# Patient Record
Sex: FEMALE | Race: WHITE | NOT HISPANIC OR LATINO | Employment: FULL TIME | URBAN - METROPOLITAN AREA
[De-identification: names, ages, dates, MRNs, and addresses within clinical notes are randomized per-mention and may not be internally consistent; named-entity substitution may affect disease eponyms.]

---

## 2022-08-31 ENCOUNTER — TRANSFERRED RECORDS (OUTPATIENT)
Dept: HEALTH INFORMATION MANAGEMENT | Facility: CLINIC | Age: 57
End: 2022-08-31

## 2022-10-19 ENCOUNTER — LAB REQUISITION (OUTPATIENT)
Dept: LAB | Facility: CLINIC | Age: 57
End: 2022-10-19

## 2022-10-19 ENCOUNTER — TRANSFERRED RECORDS (OUTPATIENT)
Dept: HEALTH INFORMATION MANAGEMENT | Facility: CLINIC | Age: 57
End: 2022-10-19

## 2022-10-19 LAB
ANION GAP SERPL CALCULATED.3IONS-SCNC: 6 MMOL/L (ref 7–15)
BUN SERPL-MCNC: 17.2 MG/DL (ref 6–20)
CALCIUM SERPL-MCNC: 9.8 MG/DL (ref 8.6–10)
CHLORIDE SERPL-SCNC: 98 MMOL/L (ref 98–107)
CREAT SERPL-MCNC: 0.75 MG/DL (ref 0.51–0.95)
DEPRECATED HCO3 PLAS-SCNC: 30 MMOL/L (ref 22–29)
GFR SERPL CREATININE-BSD FRML MDRD: >90 ML/MIN/1.73M2
GLUCOSE SERPL-MCNC: 81 MG/DL (ref 70–99)
POTASSIUM SERPL-SCNC: 5.1 MMOL/L (ref 3.4–5.3)
SODIUM SERPL-SCNC: 134 MMOL/L (ref 136–145)
TSH SERPL DL<=0.005 MIU/L-ACNC: 0.49 UIU/ML (ref 0.3–4.2)
VIT B12 SERPL-MCNC: 942 PG/ML (ref 232–1245)

## 2022-10-19 PROCEDURE — 80048 BASIC METABOLIC PNL TOTAL CA: CPT | Performed by: FAMILY MEDICINE

## 2022-10-19 PROCEDURE — 82607 VITAMIN B-12: CPT | Performed by: FAMILY MEDICINE

## 2022-10-19 PROCEDURE — 84443 ASSAY THYROID STIM HORMONE: CPT | Performed by: FAMILY MEDICINE

## 2023-04-05 ENCOUNTER — TRANSFERRED RECORDS (OUTPATIENT)
Dept: HEALTH INFORMATION MANAGEMENT | Facility: CLINIC | Age: 58
End: 2023-04-05
Payer: COMMERCIAL

## 2023-04-06 ENCOUNTER — OFFICE VISIT (OUTPATIENT)
Dept: FAMILY MEDICINE | Facility: CLINIC | Age: 58
End: 2023-04-06
Payer: COMMERCIAL

## 2023-04-06 VITALS
HEIGHT: 64 IN | BODY MASS INDEX: 24.07 KG/M2 | TEMPERATURE: 98.3 F | DIASTOLIC BLOOD PRESSURE: 77 MMHG | RESPIRATION RATE: 17 BRPM | WEIGHT: 141 LBS | SYSTOLIC BLOOD PRESSURE: 116 MMHG | OXYGEN SATURATION: 98 %

## 2023-04-06 DIAGNOSIS — K64.9 HEMORRHOIDS, UNSPECIFIED HEMORRHOID TYPE: ICD-10-CM

## 2023-04-06 DIAGNOSIS — F41.9 ANXIETY: ICD-10-CM

## 2023-04-06 DIAGNOSIS — G47.00 INSOMNIA, UNSPECIFIED TYPE: ICD-10-CM

## 2023-04-06 DIAGNOSIS — F33.1 MODERATE EPISODE OF RECURRENT MAJOR DEPRESSIVE DISORDER (H): ICD-10-CM

## 2023-04-06 DIAGNOSIS — R10.31 RLQ ABDOMINAL PAIN: Primary | ICD-10-CM

## 2023-04-06 LAB
BASOPHILS # BLD AUTO: 0 10E3/UL (ref 0–0.2)
BASOPHILS NFR BLD AUTO: 0 %
EOSINOPHIL # BLD AUTO: 0.1 10E3/UL (ref 0–0.7)
EOSINOPHIL NFR BLD AUTO: 2 %
ERYTHROCYTE [DISTWIDTH] IN BLOOD BY AUTOMATED COUNT: 13.6 % (ref 10–15)
HCT VFR BLD AUTO: 41.8 % (ref 35–47)
HGB BLD-MCNC: 13.9 G/DL (ref 11.7–15.7)
LYMPHOCYTES # BLD AUTO: 1.9 10E3/UL (ref 0.8–5.3)
LYMPHOCYTES NFR BLD AUTO: 29 %
MCH RBC QN AUTO: 30.2 PG (ref 26.5–33)
MCHC RBC AUTO-ENTMCNC: 33.3 G/DL (ref 31.5–36.5)
MCV RBC AUTO: 91 FL (ref 78–100)
MONOCYTES # BLD AUTO: 0.7 10E3/UL (ref 0–1.3)
MONOCYTES NFR BLD AUTO: 10 %
NEUTROPHILS # BLD AUTO: 3.8 10E3/UL (ref 1.6–8.3)
NEUTROPHILS NFR BLD AUTO: 59 %
PLATELET # BLD AUTO: 308 10E3/UL (ref 150–450)
RBC # BLD AUTO: 4.6 10E6/UL (ref 3.8–5.2)
WBC # BLD AUTO: 6.4 10E3/UL (ref 4–11)

## 2023-04-06 PROCEDURE — 99204 OFFICE O/P NEW MOD 45 MIN: CPT | Performed by: FAMILY MEDICINE

## 2023-04-06 PROCEDURE — 36415 COLL VENOUS BLD VENIPUNCTURE: CPT | Performed by: FAMILY MEDICINE

## 2023-04-06 PROCEDURE — 85025 COMPLETE CBC W/AUTO DIFF WBC: CPT | Performed by: FAMILY MEDICINE

## 2023-04-06 PROCEDURE — 80053 COMPREHEN METABOLIC PANEL: CPT | Performed by: FAMILY MEDICINE

## 2023-04-06 RX ORDER — ALPRAZOLAM 2 MG
2 TABLET ORAL
COMMUNITY

## 2023-04-06 RX ORDER — FLUOXETINE 10 MG/1
CAPSULE ORAL
Qty: 106 CAPSULE | Refills: 0 | Status: SHIPPED | OUTPATIENT
Start: 2023-04-06 | End: 2023-06-05

## 2023-04-06 RX ORDER — HYDROXYZINE HYDROCHLORIDE 25 MG/1
25-50 TABLET, FILM COATED ORAL
Qty: 60 TABLET | Refills: 0 | Status: SHIPPED | OUTPATIENT
Start: 2023-04-06 | End: 2024-05-24

## 2023-04-06 NOTE — PROGRESS NOTES
Assessment & Plan     RLQ abdominal pain  Will order basic labs and place order for CT of abdomen and pelvis.  Additional recommendations pending results of imaging.  - CT Abdomen Pelvis w Contrast; Future  - CBC with platelets and differential; Future  - Comprehensive metabolic panel (BMP + Alb, Alk Phos, ALT, AST, Total. Bili, TP); Future  - CBC with platelets and differential  - Comprehensive metabolic panel (BMP + Alb, Alk Phos, ALT, AST, Total. Bili, TP)    Moderate episode of recurrent major depressive disorder (H)  Discontinue Celexa.  Start fluoxetine, 1 capsule daily for 2 weeks then increase to 2 capsules daily.  Follow-up in 2 months or sooner as needed.  - FLUoxetine (PROZAC) 10 MG capsule; Take 1 capsule (10 mg) by mouth daily for 14 days, THEN 2 capsules (20 mg) daily for 46 days.    Anxiety  As above.  - FLUoxetine (PROZAC) 10 MG capsule; Take 1 capsule (10 mg) by mouth daily for 14 days, THEN 2 capsules (20 mg) daily for 46 days.    Insomnia, unspecified type  Discussed that alprazolam is a controlled substance and is not intended for regular use for sleep.  Recommend a trial of hydroxyzine for sleep instead of alprazolam.  Follow-up if this is not helping.  - hydrOXYzine (ATARAX) 25 MG tablet; Take 1-2 tablets (25-50 mg) by mouth nightly as needed for other (sleep)    Hemorrhoids, unspecified hemorrhoid type  Referral to colorectal surgery.  - Adult Colorectal Surgery  Referral; Future    42 minutes spent by me on the date of the encounter doing chart review, history and exam, documentation and further activities per the note       See Patient Instructions    Donna Livingston MD  [facility]    Subjective   Anneliece is a 57 year old, presenting for the following health issues:  Pain        4/6/2023     2:38 PM   Additional Questions   Roomed by Linh Banks CMA     [facility]     Pain History:  When did you first notice your pain? 3 months ago   Have you seen  anyone else for your pain? No  Where in your body do you have pain? Abdominal/Flank Pain    Description:   Character: Sharp and shouting   Location:  right pelvic region  Radiation: Back and right leg  Progression of Symptoms:  worsening now but was getting better  Accompanying Signs & Symptoms:  Fever/Chills: YES, chills  Gas/Bloating: YES  Nausea: YES  Vomitting: No  Diarrhea: No  Constipation: YES, a little bit   Dysuria or Hematuria: No, sometimes feels like there is burning in vagina   History:   Trauma: No  Previous similar pain: No  Previous tests done: none  Precipitating factors:   Does the pain change with:     Food: No    Bowel Movement: YES    Urination: No   Other factors:  No  Therapies tried and outcome:  Patient stated she though she was having UTI and she used AZO to treat it but it's not getting better.  Ciprofloxacin 500 mg every 8 hours x5 days.  Urinalysis in chart has 1+ LE, otherwise normal.    Patient was seen by [facility] previously, was seen yesterday.  On exam, it was noted that she had abdominal tenderness and fullness of the mid right abdomen, no RLQ tenderness.  She does have hemorrhoids. Pain comes and goes, not constant.  Got better with the antibiotics, but the doctor told her to stop.  She bought it and took it on her own, had a prescription from [facility].  Eating and drinking okay, some nausea, not vomiting, no diarrhea, some constipation.  Two hours had a bowel movement which was normal.  No pain with urination but some vaginal burning, no vaginal discharge.  History of Total hysterectomy.  He had gallbladder surgery, but does not remember if she has her appendix.  She has been taking Naproxen, which helps a little bit. She is uncertain if she still has her ovaries. No vaginal bleeding.  She is losing weght, poor appetite, eating because she has to, not because she is hungry.    Citalopram for anxiety and lack of sleep. but can't tolerated it now, taking  "alprazolam 2 mg from [facility] but has 7 pills left and she can't sleep without it.  She has not taken other medications for this, wondering what she can do to sleep.  Has tried Melatonin, but did not sleep well.  She took Sertraline, did work for her.    No LMP recorded. Patient is postmenopausal.      Review of Systems   Constitutional, HEENT, cardiovascular, pulmonary, gi and gu systems are negative, except as otherwise noted.      Objective    /77 (BP Location: Right arm, Patient Position: Sitting, Cuff Size: Adult Regular)   Temp 98.3  F (36.8  C) (Oral)   Resp 17   Ht 1.62 m (5' 3.78\")   Wt 64 kg (141 lb)   SpO2 98%   BMI 24.37 kg/m    Body mass index is 24.37 kg/m .  Physical Exam   GENERAL: healthy, alert and no distress  RESP: lungs clear to auscultation - no rales, rhonchi or wheezes  CV: regular rate and rhythm, normal S1 S2, no S3 or S4, no murmur, click or rub, no peripheral edema and peripheral pulses strong  ABDOMEN: bowel sounds normal and soft, tender to palpation in right lower quadrant  : External hemorrhoids present, nonthrombosed                "

## 2023-04-07 LAB
ALBUMIN SERPL BCG-MCNC: 4.6 G/DL (ref 3.5–5.2)
ALP SERPL-CCNC: 66 U/L (ref 35–104)
ALT SERPL W P-5'-P-CCNC: 16 U/L (ref 10–35)
ANION GAP SERPL CALCULATED.3IONS-SCNC: 11 MMOL/L (ref 7–15)
AST SERPL W P-5'-P-CCNC: 24 U/L (ref 10–35)
BILIRUB SERPL-MCNC: 0.2 MG/DL
BUN SERPL-MCNC: 16.2 MG/DL (ref 6–20)
CALCIUM SERPL-MCNC: 10.1 MG/DL (ref 8.6–10)
CHLORIDE SERPL-SCNC: 103 MMOL/L (ref 98–107)
CREAT SERPL-MCNC: 0.91 MG/DL (ref 0.51–0.95)
DEPRECATED HCO3 PLAS-SCNC: 25 MMOL/L (ref 22–29)
GFR SERPL CREATININE-BSD FRML MDRD: 73 ML/MIN/1.73M2
GLUCOSE SERPL-MCNC: 88 MG/DL (ref 70–99)
POTASSIUM SERPL-SCNC: 4.2 MMOL/L (ref 3.4–5.3)
PROT SERPL-MCNC: 7.4 G/DL (ref 6.4–8.3)
SODIUM SERPL-SCNC: 139 MMOL/L (ref 136–145)

## 2023-05-21 ENCOUNTER — HEALTH MAINTENANCE LETTER (OUTPATIENT)
Age: 58
End: 2023-05-21

## 2023-08-29 ENCOUNTER — HOSPITAL ENCOUNTER (EMERGENCY)
Facility: CLINIC | Age: 58
Discharge: HOME | End: 2023-08-29
Attending: EMERGENCY MEDICINE | Admitting: EMERGENCY MEDICINE

## 2023-08-29 ENCOUNTER — APPOINTMENT (OUTPATIENT)
Dept: CT IMAGING | Facility: CLINIC | Age: 58
End: 2023-08-29

## 2023-08-29 VITALS
SYSTOLIC BLOOD PRESSURE: 129 MMHG | HEART RATE: 59 BPM | DIASTOLIC BLOOD PRESSURE: 80 MMHG | RESPIRATION RATE: 16 BRPM | OXYGEN SATURATION: 98 % | TEMPERATURE: 98.2 F

## 2023-08-29 DIAGNOSIS — K62.89 RECTAL PAIN: ICD-10-CM

## 2023-08-29 DIAGNOSIS — R10.31 ABDOMINAL PAIN, RIGHT LOWER QUADRANT: ICD-10-CM

## 2023-08-29 DIAGNOSIS — K76.9 LIVER LESION: ICD-10-CM

## 2023-08-29 DIAGNOSIS — K64.4 EXTERNAL HEMORRHOIDS: ICD-10-CM

## 2023-08-29 LAB
ALBUMIN SERPL BCG-MCNC: 4.4 G/DL (ref 3.5–5.2)
ALBUMIN UR-MCNC: NEGATIVE MG/DL
ALP SERPL-CCNC: 60 U/L (ref 35–104)
ALT SERPL W P-5'-P-CCNC: 14 U/L (ref 0–50)
ANION GAP SERPL CALCULATED.3IONS-SCNC: 7 MMOL/L (ref 7–15)
APPEARANCE UR: CLEAR
AST SERPL W P-5'-P-CCNC: 17 U/L (ref 0–45)
BASOPHILS # BLD AUTO: 0 10E3/UL (ref 0–0.2)
BASOPHILS NFR BLD AUTO: 0 %
BILIRUB SERPL-MCNC: 0.3 MG/DL
BILIRUB UR QL STRIP: NEGATIVE
BUN SERPL-MCNC: 12 MG/DL (ref 6–20)
CALCIUM SERPL-MCNC: 10 MG/DL (ref 8.6–10)
CHLORIDE SERPL-SCNC: 105 MMOL/L (ref 98–107)
COLOR UR AUTO: YELLOW
CREAT SERPL-MCNC: 0.75 MG/DL (ref 0.51–0.95)
DEPRECATED HCO3 PLAS-SCNC: 29 MMOL/L (ref 22–29)
EOSINOPHIL # BLD AUTO: 0.1 10E3/UL (ref 0–0.7)
EOSINOPHIL NFR BLD AUTO: 1 %
ERYTHROCYTE [DISTWIDTH] IN BLOOD BY AUTOMATED COUNT: 13.5 % (ref 10–15)
GFR SERPL CREATININE-BSD FRML MDRD: >90 ML/MIN/1.73M2
GLUCOSE SERPL-MCNC: 111 MG/DL (ref 70–99)
GLUCOSE UR STRIP-MCNC: NEGATIVE MG/DL
HCT VFR BLD AUTO: 44 % (ref 35–47)
HGB BLD-MCNC: 14.6 G/DL (ref 11.7–15.7)
HGB UR QL STRIP: ABNORMAL
HOLD SPECIMEN: NORMAL
HOLD SPECIMEN: NORMAL
IMM GRANULOCYTES # BLD: 0 10E3/UL
IMM GRANULOCYTES NFR BLD: 0 %
KETONES UR STRIP-MCNC: NEGATIVE MG/DL
LEUKOCYTE ESTERASE UR QL STRIP: NEGATIVE
LIPASE SERPL-CCNC: 42 U/L (ref 13–60)
LYMPHOCYTES # BLD AUTO: 1.4 10E3/UL (ref 0.8–5.3)
LYMPHOCYTES NFR BLD AUTO: 22 %
MCH RBC QN AUTO: 30.4 PG (ref 26.5–33)
MCHC RBC AUTO-ENTMCNC: 33.2 G/DL (ref 31.5–36.5)
MCV RBC AUTO: 92 FL (ref 78–100)
MONOCYTES # BLD AUTO: 0.7 10E3/UL (ref 0–1.3)
MONOCYTES NFR BLD AUTO: 11 %
MUCOUS THREADS #/AREA URNS LPF: PRESENT /LPF
NEUTROPHILS # BLD AUTO: 4.3 10E3/UL (ref 1.6–8.3)
NEUTROPHILS NFR BLD AUTO: 66 %
NITRATE UR QL: NEGATIVE
NRBC # BLD AUTO: 0 10E3/UL
NRBC BLD AUTO-RTO: 0 /100
PH UR STRIP: 5.5 [PH] (ref 5–7)
PLATELET # BLD AUTO: 294 10E3/UL (ref 150–450)
POTASSIUM SERPL-SCNC: 4.3 MMOL/L (ref 3.4–5.3)
PROT SERPL-MCNC: 7.2 G/DL (ref 6.4–8.3)
RBC # BLD AUTO: 4.8 10E6/UL (ref 3.8–5.2)
RBC URINE: 4 /HPF
SODIUM SERPL-SCNC: 141 MMOL/L (ref 136–145)
SP GR UR STRIP: 1.02 (ref 1–1.03)
SQUAMOUS EPITHELIAL: 1 /HPF
UROBILINOGEN UR STRIP-MCNC: NORMAL MG/DL
WBC # BLD AUTO: 6.4 10E3/UL (ref 4–11)
WBC URINE: <1 /HPF

## 2023-08-29 PROCEDURE — 96374 THER/PROPH/DIAG INJ IV PUSH: CPT | Mod: 59

## 2023-08-29 PROCEDURE — 80053 COMPREHEN METABOLIC PANEL: CPT | Performed by: EMERGENCY MEDICINE

## 2023-08-29 PROCEDURE — 36415 COLL VENOUS BLD VENIPUNCTURE: CPT | Performed by: EMERGENCY MEDICINE

## 2023-08-29 PROCEDURE — 83690 ASSAY OF LIPASE: CPT | Performed by: EMERGENCY MEDICINE

## 2023-08-29 PROCEDURE — 99285 EMERGENCY DEPT VISIT HI MDM: CPT | Mod: 25

## 2023-08-29 PROCEDURE — 250N000011 HC RX IP 250 OP 636: Performed by: EMERGENCY MEDICINE

## 2023-08-29 PROCEDURE — 250N000009 HC RX 250: Performed by: EMERGENCY MEDICINE

## 2023-08-29 PROCEDURE — 96375 TX/PRO/DX INJ NEW DRUG ADDON: CPT

## 2023-08-29 PROCEDURE — 81001 URINALYSIS AUTO W/SCOPE: CPT | Performed by: EMERGENCY MEDICINE

## 2023-08-29 PROCEDURE — 74177 CT ABD & PELVIS W/CONTRAST: CPT

## 2023-08-29 PROCEDURE — 250N000011 HC RX IP 250 OP 636: Mod: JZ

## 2023-08-29 PROCEDURE — 85025 COMPLETE CBC W/AUTO DIFF WBC: CPT | Performed by: EMERGENCY MEDICINE

## 2023-08-29 RX ORDER — ONDANSETRON 2 MG/ML
4 INJECTION INTRAMUSCULAR; INTRAVENOUS EVERY 30 MIN PRN
Status: DISCONTINUED | OUTPATIENT
Start: 2023-08-29 | End: 2023-08-29 | Stop reason: HOSPADM

## 2023-08-29 RX ORDER — IOPAMIDOL 755 MG/ML
500 INJECTION, SOLUTION INTRAVASCULAR ONCE
Status: COMPLETED | OUTPATIENT
Start: 2023-08-29 | End: 2023-08-29

## 2023-08-29 RX ORDER — DICYCLOMINE HCL 20 MG
20 TABLET ORAL 2 TIMES DAILY
Qty: 20 TABLET | Refills: 0 | Status: SHIPPED | OUTPATIENT
Start: 2023-08-29 | End: 2023-09-08

## 2023-08-29 RX ORDER — ONDANSETRON 4 MG/1
4 TABLET, ORALLY DISINTEGRATING ORAL EVERY 8 HOURS PRN
Qty: 10 TABLET | Refills: 0 | Status: SHIPPED | OUTPATIENT
Start: 2023-08-29 | End: 2023-09-01

## 2023-08-29 RX ORDER — KETOROLAC TROMETHAMINE 15 MG/ML
15 INJECTION, SOLUTION INTRAMUSCULAR; INTRAVENOUS ONCE
Status: COMPLETED | OUTPATIENT
Start: 2023-08-29 | End: 2023-08-29

## 2023-08-29 RX ADMIN — ONDANSETRON 4 MG: 2 INJECTION INTRAMUSCULAR; INTRAVENOUS at 15:37

## 2023-08-29 RX ADMIN — SODIUM CHLORIDE 58 ML: 9 INJECTION, SOLUTION INTRAVENOUS at 15:50

## 2023-08-29 RX ADMIN — IOPAMIDOL 71 ML: 755 INJECTION, SOLUTION INTRAVENOUS at 15:50

## 2023-08-29 RX ADMIN — KETOROLAC TROMETHAMINE 15 MG: 15 INJECTION, SOLUTION INTRAMUSCULAR; INTRAVENOUS at 15:33

## 2023-08-29 ASSESSMENT — ACTIVITIES OF DAILY LIVING (ADL)
ADLS_ACUITY_SCORE: 35
ADLS_ACUITY_SCORE: 35

## 2023-08-29 NOTE — ED NOTES
Tele-PIT/Intake Evaluation      Video-Visit Details    Type of service:  Video Visit    Video Start Time (time video started): 2:05 PM  Video End Time (time video stopped): 2:08 PM   Originating Location (pt. Location): [facility]  Distant Location (provider location):  [facility]  Mode of Communication:  Video Conference via [employer]  Patient verbally consented to [employer] televisit.    History:  Patient daughter translating.  Patient complains of pain and burning in rectum.  Also complain of RLQ abdominal pain and dysuria that is acutely worse today. .  Nausea but no vomiting.  Passing gas.  Chills but no fever. History of appendectomy and cholecystectomy.  No bloody stools    Exam:  General:  Alert, interactive  Cardiovascular:  Well perfused  Lungs:  No respiratory distress, no accessory muscle use  Neuro:  Moving all 4 extremities  Skin:  Warm, dry  Psych:  Normal affect      Patient Vitals for the past 24 hrs:   BP Temp Temp src Pulse Resp SpO2   08/29/23 1333 117/70 98.2  F (36.8  C) Temporal 64 16 99 %       Appropriate interventions for symptom management were initiated if applicable.  Appropriate diagnostic tests were initiated if indicated.    Important information for subsequent clinician:  Rectal pain and RLQ pain with dysuria.  Labs and UA ordered    I briefly evaluated the patient and developed an initial plan of care. I discussed this plan and explained that this brief interaction does not constitute a full evaluation. Patient/family understands that they should wait to be fully evaluated and discuss any test results with another clinician prior to leaving the hospital.       Princess Ma MD  08/29/23 0608

## 2023-08-29 NOTE — ED NOTES
Emergency Department Attending Supervision Note  2/21/2018  4:46 PM      I evaluated this patient in conjunction with Odilon MOJICA      Briefly, the patient presented with right lower quadrant abdominal pain and perirectal discomfort, .  No fever, no vomiting, no melena        On my exam,     No significant localizing abdominal tenderness, no distention  CV: ppi, regular   Resp: speaking in full sentences without any resp distress  Skin: warm dry well perfused  Neuro: Alert, no gross motor or sensory deficits,  gait stable        Results:  CT Abdomen Pelvis w Contrast   Final Result   IMPRESSION:    1.  No imaging findings to explain patient's pain. No bowel   obstruction or organized fluid collection.   2.  Indeterminate right hepatic dome hypodensity (1.3 cm), although   statistically likely benign (i.e. hemangioma) in the absence of prior   malignancy history.       NIKOLAS OLIVARES MD            SYSTEM ID:  [id number]        Labs Ordered and Resulted from Time of ED Arrival to Time of ED Departure   COMPREHENSIVE METABOLIC PANEL - Abnormal       Result Value    Sodium 141      Potassium 4.3      Chloride 105      Carbon Dioxide (CO2) 29      Anion Gap 7      Urea Nitrogen 12.0      Creatinine 0.75      Calcium 10.0      Glucose 111 (*)     Alkaline Phosphatase 60      AST 17      ALT 14      Protein Total 7.2      Albumin 4.4      Bilirubin Total 0.3      GFR Estimate >90     ROUTINE UA WITH MICROSCOPIC REFLEX TO CULTURE - Abnormal    Color Urine Yellow      Appearance Urine Clear      Glucose Urine Negative      Bilirubin Urine Negative      Ketones Urine Negative      Specific Gravity Urine 1.022      Blood Urine Small (*)     pH Urine 5.5      Protein Albumin Urine Negative      Urobilinogen Urine Normal      Nitrite Urine Negative      Leukocyte Esterase Urine Negative      Mucus Urine Present (*)     RBC Urine 4 (*)     WBC Urine <1      Squamous Epithelials Urine 1     LIPASE - Normal    Lipase 42      CBC WITH PLATELETS AND DIFFERENTIAL    WBC Count 6.4      RBC Count 4.80      Hemoglobin 14.6      Hematocrit 44.0      MCV 92      MCH 30.4      MCHC 33.2      RDW 13.5      Platelet Count 294      % Neutrophils 66      % Lymphocytes 22      % Monocytes 11      % Eosinophils 1      % Basophils 0      % Immature Granulocytes 0      NRBCs per 100 WBC 0      Absolute Neutrophils 4.3      Absolute Lymphocytes 1.4      Absolute Monocytes 0.7      Absolute Eosinophils 0.1      Absolute Basophils 0.0      Absolute Immature Granulocytes 0.0      Absolute NRBCs 0.0           My impression is abdominal pain of unknown etiology however reassuring work-up here in terms of blood tests, urinalysis, CT scan of the abdomen and pelvis.  No evidence of surgical vascular or infectious emergency.  Safe for discharge home to allow this process to clinically declare itself.         Diagnosis      ICD-10-CM    1. Abdominal pain, right lower quadrant  R10.31       2. Rectal pain  K62.89       3. External hemorrhoids  K64.4       4. Liver lesion  K76.9                 Dinesh Odom MD  [facility]  [facility]        Dinesh Odom MD  08/29/23 9242

## 2023-08-29 NOTE — ED PROVIDER NOTES
History   Chief Complaint:  Abdominal Pain    HPI   Anneliece TAYLOR Cummins is a 57 year old female with history of depression, anxiety, insomnia, and hemorrhoids presenting to the emergency department for evaluation of abdominal and rectal pain. The patient states that approximately 15 days ago she developed right lower abdominal pain and rectal pain with gas, nausea, and stool urgency. She has had these symptoms intermittently for the past year, but has not had any blood work or imaging to evaluate this. The patient was previously scheduled for a CT of her abdomen that had not yet been performed.  Patient states that she has not noticed anything that makes her pain better or worse, passing stool does not affect her pain.  Bowel movements have been normal without blood or black/tarry appearance.  Patient denies fever, chest pain, shortness of breath, back pain, dysuria, urinary frequency/urgency, hematuria, or vomiting.    Independent Historian:   The patient's daughter interprets for the patient.    Review of External Notes:   4/6/23: PCP visit for right lower quadrant abdominal pain, orders for basic labs and CT of the abdomen and pelvis placed, not yet performed.  Referred to colorectal surgery for hemorrhoids.    Medications:    Xanax   Prozac   Atarax     Past Medical History:    Anxiety   Moderate major depressive disorder     Physical Exam   Patient Vitals for the past 24 hrs:   BP Temp Temp src Pulse Resp SpO2   08/29/23 1741 129/80 -- -- 59 -- 98 %   08/29/23 1544 -- -- -- -- -- 98 %   08/29/23 1539 -- -- -- -- -- 99 %   08/29/23 1536 134/83 -- -- 58 -- 99 %   08/29/23 1333 117/70 98.2  F (36.8  C) Temporal 64 16 99 %     Physical Exam  General: Alert, appears well-developed and well-nourished. Cooperative.     In moderate distress  HEENT:  Head:  Atraumatic  Ears:  External ears are normal  Eyes:   Conjunctivae normal and EOM are normal. No scleral icterus.    Pupils are equal, round, and reactive  to light.   Neck:   Normal range of motion. Neck supple.  CV:  Normal rate, regular rhythm, normal heart sounds and radial and dorsalis pedis pulses are 2+ and symmetric.  No murmur.  Resp:  Breath sounds are clear bilaterally    Non-labored, no retractions or accessory muscle use  GI:  Mild TTP over RLQ. Well healed surgical incisions. No CVA tenderness. Abdomen is soft, no distension. No rebound or guarding.  Rectal: Two external nonthrombosed hemorrhoids. No evidence for perirectal abscess or fissure.   MS:  Normal range of motion. No edema.    Normal strength in all 4 extremities.     Back atraumatic.    No midline cervical, thoracic, or lumbar tenderness  Skin:  Warm and dry.  No rash or lesions noted.  Neuro:   Alert. Normal strength.  Sensation intact in all 4 extremities. GCS: 15  Psych: Normal mood and affect.    Emergency Department Course   Imaging:  CT Abdomen Pelvis w Contrast   Final Result   IMPRESSION:    1.  No imaging findings to explain patient's pain. No bowel   obstruction or organized fluid collection.   2.  Indeterminate right hepatic dome hypodensity (1.3 cm), although   statistically likely benign (i.e. hemangioma) in the absence of prior   malignancy history.       NIKOLAS OLIVARES MD            SYSTEM ID:  [id number]         Report per radiology    Laboratory:  Labs Ordered and Resulted from Time of ED Arrival to Time of ED Departure   COMPREHENSIVE METABOLIC PANEL - Abnormal       Result Value    Sodium 141      Potassium 4.3      Chloride 105      Carbon Dioxide (CO2) 29      Anion Gap 7      Urea Nitrogen 12.0      Creatinine 0.75      Calcium 10.0      Glucose 111 (*)     Alkaline Phosphatase 60      AST 17      ALT 14      Protein Total 7.2      Albumin 4.4      Bilirubin Total 0.3      GFR Estimate >90     ROUTINE UA WITH MICROSCOPIC REFLEX TO CULTURE - Abnormal    Color Urine Yellow      Appearance Urine Clear      Glucose Urine Negative      Bilirubin Urine Negative       Ketones Urine Negative      Specific Gravity Urine 1.022      Blood Urine Small (*)     pH Urine 5.5      Protein Albumin Urine Negative      Urobilinogen Urine Normal      Nitrite Urine Negative      Leukocyte Esterase Urine Negative      Mucus Urine Present (*)     RBC Urine 4 (*)     WBC Urine <1      Squamous Epithelials Urine 1     LIPASE - Normal    Lipase 42     CBC WITH PLATELETS AND DIFFERENTIAL    WBC Count 6.4      RBC Count 4.80      Hemoglobin 14.6      Hematocrit 44.0      MCV 92      MCH 30.4      MCHC 33.2      RDW 13.5      Platelet Count 294      % Neutrophils 66      % Lymphocytes 22      % Monocytes 11      % Eosinophils 1      % Basophils 0      % Immature Granulocytes 0      NRBCs per 100 WBC 0      Absolute Neutrophils 4.3      Absolute Lymphocytes 1.4      Absolute Monocytes 0.7      Absolute Eosinophils 0.1      Absolute Basophils 0.0      Absolute Immature Granulocytes 0.0      Absolute NRBCs 0.0       Emergency Department Course & Assessments:    Interventions:  Medications   ondansetron (ZOFRAN) injection 4 mg (4 mg Intravenous $Given 8/29/23 1537)   ketorolac (TORADOL) injection 15 mg (15 mg Intravenous $Given 8/29/23 1533)   iopamidol (ISOVUE-370) solution 500 mL (71 mLs Intravenous $Given 8/29/23 1550)   CT Scan Flush (58 mLs Intravenous $Given 8/29/23 1550)        Assessments:  1503: Initial evaluation and assessment.  1700: Patient reassessed, discussed plan for discharge, patient in agreement with this.    Independent Interpretation (X-rays, CTs, rhythm strip):  None    Consultations/Discussion of Management or Tests:  Patient was seen in conjunction with Dr. Odom        Social Determinants of Health affecting care:   None    Disposition:  The patient was discharged to home.     Impression & Plan    CMS Diagnoses: None    Medical Decision Making:  Renny Cummins is a 57 year old female with history of depression, anxiety, insomnia, and hemorrhoids presenting to the  emergency department for evaluation of abdominal and rectal pain. On exam, the patient has mild tenderness palpation over the right lower quadrant without distention or rebound.  Rectal exam shows evidence of 2 external hemorrhoids, which are nonthrombosed, and patient denies any blood in the stool.  Vital signs are within normal limits without evidence of hypotension, fever, tachycardia, or hypoxia.  Blood work was obtained which shows no evidence of leukocytosis, anemia, or other electrolyte abnormalities. UA does not show any signs of infection. CT of the abdomen was obtained due to ongoing abdominal/rectal pain, which is also unremarkable. There was an incidental finding of an indeterminate right hepatic dome hypodensity, and we discussed this with the patient and the importance of follow-up.  At this time, there are no concerning findings for acute intra-abdominal pathology as the cause of the patient's symptoms.  We recommended the patient follow-up with her primary care provider and/or colon and rectal surgery for reevaluation and ongoing management of symptoms. We prescribed the patient Bentyl and Zofran to take as needed for abdominal pain and nausea. She was advised to return to the ED for fevers, worsening abdominal pain, vomiting, diarrhea, blood in the stool, any other new concerns.  The patient was in agreement with this plan all questions answered.    Diagnosis:    ICD-10-CM    1. Abdominal pain, right lower quadrant  R10.31       2. Rectal pain  K62.89       3. External hemorrhoids  K64.4       4. Liver lesion  K76.9            Discharge Medications:  Discharge Medication List as of 8/29/2023  5:44 PM        START taking these medications    Details   dicyclomine (BENTYL) 20 MG tablet Take 1 tablet (20 mg) by mouth 2 times daily for 10 days, Disp-20 tablet, R-0, E-Prescribe      ondansetron (ZOFRAN ODT) 4 MG ODT tab Take 1 tablet (4 mg) by mouth every 8 hours as needed for nausea, Disp-10 tablet,  R-0, E-Prescribe            Scribe Disclosure:  I, Becky Jackson, am serving as a scribe at 3:12 PM on 8/29/2023 to document services personally performed by Jammie Stewart PA-C based on my observations and the provider's statements to me.     8/29/2023   Jammie Stewart PA-C Berthiaume, Carley J, PA-C  08/29/23 2016

## 2023-08-29 NOTE — DISCHARGE INSTRUCTIONS
-Follow-up with your primary care provider and colorectal specialist soon as you are able to for reevaluation and ongoing management of symptoms.  -Can take Tylenol and ibuprofen in addition to prescribed medications as needed for symptoms.  -Return to ED for fevers, worsening abdominal pain, vomiting, diarrhea, blood in the stool, or any other new concerns.

## 2023-08-29 NOTE — Clinical Note
Renny Cummins was seen and treated in our emergency department on 8/29/2023.  She may return to work on 09/01/2023.       If you have any questions or concerns, please don't hesitate to call.      Jammie Stewart PA-C

## 2023-09-20 ENCOUNTER — OFFICE VISIT (OUTPATIENT)
Dept: FAMILY MEDICINE | Facility: CLINIC | Age: 58
End: 2023-09-20
Payer: COMMERCIAL

## 2023-09-20 VITALS
HEIGHT: 61 IN | HEART RATE: 74 BPM | DIASTOLIC BLOOD PRESSURE: 71 MMHG | RESPIRATION RATE: 20 BRPM | BODY MASS INDEX: 26.85 KG/M2 | TEMPERATURE: 98 F | WEIGHT: 142.2 LBS | OXYGEN SATURATION: 97 % | SYSTOLIC BLOOD PRESSURE: 112 MMHG

## 2023-09-20 DIAGNOSIS — K64.4 EXTERNAL HEMORRHOID: ICD-10-CM

## 2023-09-20 DIAGNOSIS — K59.00 CONSTIPATION, UNSPECIFIED CONSTIPATION TYPE: ICD-10-CM

## 2023-09-20 DIAGNOSIS — K62.89 RECTAL PAIN: Primary | ICD-10-CM

## 2023-09-20 PROCEDURE — 99214 OFFICE O/P EST MOD 30 MIN: CPT

## 2023-09-20 RX ORDER — POLYETHYLENE GLYCOL 3350 17 G/17G
1 POWDER, FOR SOLUTION ORAL DAILY
Qty: 510 G | Refills: 0 | Status: SHIPPED | OUTPATIENT
Start: 2023-09-20 | End: 2023-11-23

## 2023-09-20 ASSESSMENT — ENCOUNTER SYMPTOMS
FATIGUE: 0
CONSTIPATION: 1
NAUSEA: 0
ABDOMINAL PAIN: 1
CHILLS: 0
DIARRHEA: 0
VOMITING: 0
HEMATOCHEZIA: 0

## 2023-09-20 ASSESSMENT — ANXIETY QUESTIONNAIRES
1. FEELING NERVOUS, ANXIOUS, OR ON EDGE: NEARLY EVERY DAY
3. WORRYING TOO MUCH ABOUT DIFFERENT THINGS: MORE THAN HALF THE DAYS
4. TROUBLE RELAXING: NOT AT ALL
6. BECOMING EASILY ANNOYED OR IRRITABLE: NOT AT ALL
5. BEING SO RESTLESS THAT IT IS HARD TO SIT STILL: NOT AT ALL
GAD7 TOTAL SCORE: 11
7. FEELING AFRAID AS IF SOMETHING AWFUL MIGHT HAPPEN: NEARLY EVERY DAY
2. NOT BEING ABLE TO STOP OR CONTROL WORRYING: NEARLY EVERY DAY
GAD7 TOTAL SCORE: 11
IF YOU CHECKED OFF ANY PROBLEMS ON THIS QUESTIONNAIRE, HOW DIFFICULT HAVE THESE PROBLEMS MADE IT FOR YOU TO DO YOUR WORK, TAKE CARE OF THINGS AT HOME, OR GET ALONG WITH OTHER PEOPLE: SOMEWHAT DIFFICULT

## 2023-09-20 ASSESSMENT — PATIENT HEALTH QUESTIONNAIRE - PHQ9
SUM OF ALL RESPONSES TO PHQ QUESTIONS 1-9: 12
SUM OF ALL RESPONSES TO PHQ QUESTIONS 1-9: 12
10. IF YOU CHECKED OFF ANY PROBLEMS, HOW DIFFICULT HAVE THESE PROBLEMS MADE IT FOR YOU TO DO YOUR WORK, TAKE CARE OF THINGS AT HOME, OR GET ALONG WITH OTHER PEOPLE: SOMEWHAT DIFFICULT

## 2023-09-20 NOTE — PROGRESS NOTES
"  Assessment & Plan     (K62.89) Rectal pain  (primary encounter diagnosis)  (K64.4) External hemorrhoid  (K59.00) Constipation, unspecified constipation type  Comment: Like patient to go and be evaluated by colorectal surgery.  In the meantime we will provide Preparation H, would also like patient to avoid any constipation or straining with bowel movements will provide MiraLAX.  Suggest increasing water intake, fiber intake, exercise, and avoid sitting for long periods of time.Discussed medication risks and benefits of Preparation H and Miralax with patient in detail with patient verbal understanding.  Discussed having patient follow-up if noting no improvement with Preparation H and will consider nitroglycerin ointment in interim between now and colorectal surgery appointment. Patient fully understands and is agreeable with plan of care, at this point patient will follow up as needed unless acute concerns arise in the meantime.  Plan: phenylephrine-shark liver oil-mineral         oil-petrolatum (PREPARATION H) 0.25-3-14-71.9 %        rectal ointment, polyethylene glycol (MIRALAX)         17 GM/Dose powder, Adult Colorectal Surgery          Referral    35 minutes spent by me on the date of the encounter doing chart review, history and exam, documentation and further activities per the note     MED REC REQUIRED  Post Medication Reconciliation Status: discharge medications reconciled, continue medications without change  BMI:   Estimated body mass index is 26.87 kg/m  as calculated from the following:    Height as of this encounter: 1.549 m (5' 1\").    Weight as of this encounter: 64.5 kg (142 lb 3.2 oz).     NAHED Navarro [facility]    Subjective   Anneliece is a 58 year old, presenting for the following health issues:  Hospital F/U        9/20/2023     4:06 PM   Additional Questions   Roomed by EROS Peter     Hospital Follow-up Visit:    Hospital/Nursing Home/IP " "Rehab Facility: [facility]  Date of Admission: 08/29/2023  Date of Discharge: 08/29/2023  Reason(s) for Admission: Abdominal pain, right lower quadrant Rectal pain    Was your hospitalization related to COVID-19? No   Problems taking medications regularly:  None  Medication changes since discharge: None  Problems adhering to non-medication therapy:  None    Summary of hospitalization:  [facility] discharge summary reviewed  Diagnostic Tests/Treatments reviewed.  Follow up needed: colorectal surgery   Other Healthcare Providers Involved in Patient s Care:         None  Update since discharge: stable.             -Went into ED w/ rectal heaviness/pain   -Patient still feels there is a \"weight\" in rectum, describes it as a lump on her rectum  -Patient seen in April by PCP and at that time Colorectal surgery referral placed, patient did not follow up on this.   -Patient pain worse w/ sitting   - Patient has been using ibuprofen and Tylenol without any relief  -Patient has been having 2-3 bowel movements a day, she does note straining occasionally  -Just concerned because she would like some pain relief  -She denies blood in stool    Plan of care communicated with patient    Review of Systems   Constitutional:  Negative for chills and fatigue.   Gastrointestinal:  Positive for abdominal pain and constipation. Negative for diarrhea, hematochezia, nausea and vomiting.      Constitutional, cardiovascular, pulmonary, gi and gu systems are negative, except as otherwise noted.      Objective    /71 (BP Location: Right arm, Patient Position: Sitting, Cuff Size: Adult Regular)   Pulse 74   Temp 98  F (36.7  C) (Oral)   Resp 20   Ht 1.549 m (5' 1\")   Wt 64.5 kg (142 lb 3.2 oz)   SpO2 97%   BMI 26.87 kg/m    Body mass index is 26.87 kg/m .  Physical Exam  Vitals and nursing note reviewed.   Constitutional:       General: She is not in acute distress.     Appearance: Normal " appearance. She is not ill-appearing.   Cardiovascular:      Rate and Rhythm: Normal rate.   Pulmonary:      Effort: Pulmonary effort is normal.   Genitourinary:     Rectum: Tenderness and external hemorrhoid present. No mass, anal fissure or internal hemorrhoid.   Skin:     General: Skin is warm and dry.   Neurological:      Mental Status: She is alert.   Psychiatric:         Mood and Affect: Mood normal.         Behavior: Behavior normal.         Thought Content: Thought content normal.         Judgment: Judgment normal.        CT ABDOMEN PELVIS WITH CONTRAST 8/29/2023 3:54 PM     CLINICAL HISTORY: Abdominal pain. Evaluation of right lower quadrant  abdominal pain. Status post appendectomy, cholecystectomy and  hysterectomy with oophorectomy.     TECHNIQUE: CT scan of the abdomen and pelvis was performed following  injection of IV contrast. Multiplanar reformats were obtained. Dose  reduction techniques were used.  CONTRAST: 71mL Isovue-370     COMPARISON: None available.     FINDINGS:   LOWER CHEST: No infiltrates or effusions.     HEPATOBILIARY: Right hepatic dome hypodensity (1.3 cm) with  questionable nodular enhancement. Status post cholecystectomy. Mild  central biliary dilatation, likely postcholecystectomy reservoir  effect.     PANCREAS: No significant mass, duct dilatation, or inflammatory  change.     SPLEEN: Normal size.     ADRENAL GLANDS: No significant nodules.     KIDNEYS/BLADDER: No significant mass, stones, or hydronephrosis.     BOWEL: No obstruction or inflammatory change. Appendix is not  visualized and reportedly surgically absent.     PELVIC ORGANS: Uterus is surgically absent. No adnexal mass.     ADDITIONAL FINDINGS: No ascites. No enlarged abdominal or pelvic lymph  node.     MUSCULOSKELETAL: Unremarkable.                                                                      IMPRESSION:   1.  No imaging findings to explain patient's pain. No bowel  obstruction or organized fluid  collection.  2.  Indeterminate right hepatic dome hypodensity (1.3 cm), although  statistically likely benign (i.e. hemangioma) in the absence of prior  malignancy history.      NIKOLAS OLIVARES MD       Answers submitted by the patient for this visit:  Patient Health Questionnaire (Submitted on 9/20/2023)  If you checked off any problems, how difficult have these problems made it for you to do your work, take care of things at home, or get along with other people?: Somewhat difficult  PHQ9 TOTAL SCORE: 12  PEDRO-7 (Submitted on 9/20/2023)  PEDRO 7 TOTAL SCORE: 11

## 2023-09-20 NOTE — PATIENT INSTRUCTIONS
Miralax for constipation or to help prevent straining    Preparation H for hemorrhoids    Colorectal surgery referral placed for hemorrhoids    Drink plenty of water, increase fiber, increase exercise.

## 2023-09-21 ENCOUNTER — TELEPHONE (OUTPATIENT)
Dept: SURGERY | Facility: CLINIC | Age: 58
End: 2023-09-21

## 2023-09-21 NOTE — TELEPHONE ENCOUNTER
[facility] Call Center    Phone Message    May a detailed message be left on voicemail: yes     Reason for Call: Other: Pt called to schedule, she is in pain with her hemorrhoids, please call w/ (Romansh) thank you      Action Taken: Message routed to:  [facility] colon and rectal     Travel Screening: Not Applicable

## 2023-09-21 NOTE — TELEPHONE ENCOUNTER
This is a repeat encounter. Please see other encounter for scheduling instructions. Already sent to schedulers.

## 2023-09-25 ENCOUNTER — APPOINTMENT (OUTPATIENT)
Dept: INTERPRETER SERVICES | Facility: CLINIC | Age: 58
End: 2023-09-25

## 2023-10-05 ENCOUNTER — TELEPHONE (OUTPATIENT)
Dept: SURGERY | Facility: CLINIC | Age: 58
End: 2023-10-05

## 2023-10-05 ENCOUNTER — HOSPITAL ENCOUNTER (OUTPATIENT)
Facility: CLINIC | Age: 58
End: 2023-10-05
Attending: COLON & RECTAL SURGERY | Admitting: COLON & RECTAL SURGERY
Payer: COMMERCIAL

## 2023-10-05 ENCOUNTER — TRANSCRIBE ORDERS (OUTPATIENT)
Dept: GASTROENTEROLOGY | Facility: CLINIC | Age: 58
End: 2023-10-05

## 2023-10-05 DIAGNOSIS — R19.4 CHANGE IN BOWEL HABITS: Primary | ICD-10-CM

## 2023-10-05 NOTE — TELEPHONE ENCOUNTER
[facility] Call Center    Phone Message    May a detailed message be left on voicemail: yes     Reason for Call: Symptoms or Concerns     If patient has red-flag symptoms, warm transfer to triage line    Current symptom or concern: rectal pain and constant pressure in her rectum    Symptoms have been present for:  3 month(s)    Has patient previously been seen for this? Yes    By : Graciela Pinto PA-C     Date: 10.03.23    Are there any new or worsening symptoms? Yes: pressure and pain    Action Taken: Message routed to:  [facility] crs    Travel Screening: Not Applicable

## 2023-10-11 ENCOUNTER — TELEPHONE (OUTPATIENT)
Dept: FAMILY MEDICINE | Facility: CLINIC | Age: 58
End: 2023-10-11

## 2023-10-11 NOTE — TELEPHONE ENCOUNTER
Pt coming into clinic requesting to schedule an appointment on 10/12. Scheduled pt with Michaela Reyes PA-C on 10/12 to possibly discuss labs from visit to [facility].     Jacqui ORTEZ RN   PAL (Patient Advocate Liaison)  [facility]

## 2023-11-14 ENCOUNTER — APPOINTMENT (OUTPATIENT)
Dept: INTERPRETER SERVICES | Facility: CLINIC | Age: 58
End: 2023-11-14

## 2023-11-23 ENCOUNTER — HOSPITAL ENCOUNTER (EMERGENCY)
Facility: CLINIC | Age: 58
Discharge: HOME | End: 2023-11-23
Attending: EMERGENCY MEDICINE | Admitting: EMERGENCY MEDICINE

## 2023-11-23 VITALS
SYSTOLIC BLOOD PRESSURE: 128 MMHG | OXYGEN SATURATION: 99 % | RESPIRATION RATE: 20 BRPM | HEART RATE: 84 BPM | TEMPERATURE: 97.7 F | DIASTOLIC BLOOD PRESSURE: 96 MMHG

## 2023-11-23 DIAGNOSIS — K62.89 RECTAL PAIN: ICD-10-CM

## 2023-11-23 DIAGNOSIS — R10.9 ABDOMINAL PAIN: ICD-10-CM

## 2023-11-23 DIAGNOSIS — K64.4 EXTERNAL HEMORRHOIDS: ICD-10-CM

## 2023-11-23 LAB
ABO/RH(D): NORMAL
ALBUMIN UR-MCNC: NEGATIVE MG/DL
ANION GAP SERPL CALCULATED.3IONS-SCNC: 10 MMOL/L (ref 7–15)
ANTIBODY SCREEN: NEGATIVE
APPEARANCE UR: CLEAR
BASOPHILS # BLD AUTO: 0 10E3/UL (ref 0–0.2)
BASOPHILS NFR BLD AUTO: 0 %
BILIRUB UR QL STRIP: NEGATIVE
BUN SERPL-MCNC: 11.7 MG/DL (ref 8–23)
CALCIUM SERPL-MCNC: 9.9 MG/DL (ref 8.6–10)
CHLORIDE SERPL-SCNC: 102 MMOL/L (ref 98–107)
COLOR UR AUTO: ABNORMAL
CREAT SERPL-MCNC: 0.74 MG/DL (ref 0.51–0.95)
DEPRECATED HCO3 PLAS-SCNC: 26 MMOL/L (ref 22–29)
EGFRCR SERPLBLD CKD-EPI 2021: >90 ML/MIN/1.73M2
EOSINOPHIL # BLD AUTO: 0 10E3/UL (ref 0–0.7)
EOSINOPHIL NFR BLD AUTO: 0 %
ERYTHROCYTE [DISTWIDTH] IN BLOOD BY AUTOMATED COUNT: 13.2 % (ref 10–15)
GLUCOSE SERPL-MCNC: 91 MG/DL (ref 70–99)
GLUCOSE UR STRIP-MCNC: NEGATIVE MG/DL
HCT VFR BLD AUTO: 48.9 % (ref 35–47)
HGB BLD-MCNC: 16.1 G/DL (ref 11.7–15.7)
HGB UR QL STRIP: ABNORMAL
IMM GRANULOCYTES # BLD: 0 10E3/UL
IMM GRANULOCYTES NFR BLD: 0 %
KETONES UR STRIP-MCNC: 10 MG/DL
LEUKOCYTE ESTERASE UR QL STRIP: NEGATIVE
LYMPHOCYTES # BLD AUTO: 1.1 10E3/UL (ref 0.8–5.3)
LYMPHOCYTES NFR BLD AUTO: 15 %
MCH RBC QN AUTO: 30.2 PG (ref 26.5–33)
MCHC RBC AUTO-ENTMCNC: 32.9 G/DL (ref 31.5–36.5)
MCV RBC AUTO: 92 FL (ref 78–100)
MONOCYTES # BLD AUTO: 0.6 10E3/UL (ref 0–1.3)
MONOCYTES NFR BLD AUTO: 9 %
MUCOUS THREADS #/AREA URNS LPF: PRESENT /LPF
NEUTROPHILS # BLD AUTO: 5.5 10E3/UL (ref 1.6–8.3)
NEUTROPHILS NFR BLD AUTO: 76 %
NITRATE UR QL: NEGATIVE
NRBC # BLD AUTO: 0 10E3/UL
NRBC BLD AUTO-RTO: 0 /100
PH UR STRIP: 5 [PH] (ref 5–7)
PLATELET # BLD AUTO: 310 10E3/UL (ref 150–450)
POTASSIUM SERPL-SCNC: 4.4 MMOL/L (ref 3.4–5.3)
RBC # BLD AUTO: 5.33 10E6/UL (ref 3.8–5.2)
RBC URINE: <1 /HPF
SODIUM SERPL-SCNC: 138 MMOL/L (ref 135–145)
SP GR UR STRIP: 1.01 (ref 1–1.03)
SPECIMEN EXPIRATION DATE: NORMAL
UROBILINOGEN UR STRIP-MCNC: NORMAL MG/DL
WBC # BLD AUTO: 7.3 10E3/UL (ref 4–11)
WBC URINE: 1 /HPF

## 2023-11-23 PROCEDURE — 250N000013 HC RX MED GY IP 250 OP 250 PS 637: Performed by: EMERGENCY MEDICINE

## 2023-11-23 PROCEDURE — 85025 COMPLETE CBC W/AUTO DIFF WBC: CPT | Performed by: EMERGENCY MEDICINE

## 2023-11-23 PROCEDURE — 36415 COLL VENOUS BLD VENIPUNCTURE: CPT | Performed by: EMERGENCY MEDICINE

## 2023-11-23 PROCEDURE — 80048 BASIC METABOLIC PNL TOTAL CA: CPT | Performed by: EMERGENCY MEDICINE

## 2023-11-23 PROCEDURE — 86901 BLOOD TYPING SEROLOGIC RH(D): CPT | Performed by: EMERGENCY MEDICINE

## 2023-11-23 PROCEDURE — 99284 EMERGENCY DEPT VISIT MOD MDM: CPT

## 2023-11-23 PROCEDURE — 81001 URINALYSIS AUTO W/SCOPE: CPT

## 2023-11-23 RX ORDER — HYDROCORTISONE 25 MG/G
CREAM TOPICAL 2 TIMES DAILY PRN
Qty: 30 G | Refills: 0 | Status: SHIPPED | OUTPATIENT
Start: 2023-11-23

## 2023-11-23 RX ORDER — POLYETHYLENE GLYCOL 3350 17 G/17G
1 POWDER, FOR SOLUTION ORAL DAILY
Qty: 527 G | Refills: 0 | Status: SHIPPED | OUTPATIENT
Start: 2023-11-23 | End: 2023-12-24

## 2023-11-23 RX ORDER — LIDOCAINE 50 MG/G
OINTMENT TOPICAL EVERY 6 HOURS PRN
Qty: 30 G | Refills: 0 | Status: SHIPPED | OUTPATIENT
Start: 2023-11-23

## 2023-11-23 RX ORDER — OXYCODONE HYDROCHLORIDE 5 MG/1
5 TABLET ORAL ONCE
Status: COMPLETED | OUTPATIENT
Start: 2023-11-23 | End: 2023-11-23

## 2023-11-23 RX ADMIN — OXYCODONE HYDROCHLORIDE 5 MG: 5 TABLET ORAL at 12:20

## 2023-11-23 ASSESSMENT — ACTIVITIES OF DAILY LIVING (ADL)
ADLS_ACUITY_SCORE: 35
ADLS_ACUITY_SCORE: 35

## 2023-11-23 NOTE — ED PROVIDER NOTES
History     Chief Complaint:  Rectal/perineal Pain       [facility]   Anneliece TAYLOR Cummins is a 58 year old female with a history of abdominal pain, rectal pain, anxiety, depression who presents to the emergency department for rectal/perineal pain.  The patient states that she has had abdominal and rectal pain for the last 7 months.  She was seen in the ED on 8/29/2023 for abdominal pain as well where she had a CT obtained which was negative for any acute pathology.  She was noted to have 2 nonthrombosed external hemorrhoids at that time and was given Bentyl and Zofran for symptomatic management.  The patient followed up with her primary care provider and an H. pylori test which was positive.  She was started on amoxicillin, clarithromycin, and omeprazole which improved her symptoms.  However, 2 weeks ago the patient developed the same abdominal and rectal pain following this and notes that it feels like a burning sensation.  The pain has been persistent prompting presentation to the ED today.  Patient has had associated decreased appetite, nausea, mucus with stool, and a small amount of blood in the stool.  She notes that her last normal bowel movement was this morning.  The patient plan to follow-up with colorectal, but discontinued follow-up visit secondary to financial issues.  The patient denies fever, urinary symptoms, vomiting, or melena.      Independent Historian:   The patient provides a history through an  via phone.    Review of External Notes:   8/29/23: Patient evaluated in the ED for abdominal and rectal pain as noted in HPI.    Medications:    Alprazolam  Fluoxetine  Hydroxyzine    Past Medical History:    Anxiety  Depressive disorder  S/P cholecystectomy  H. Pylori    Past Surgical History:    Cholecystectomy  Appendectomy    Physical Exam   Patient Vitals for the past 24 hrs:   BP Temp Temp src Pulse Resp SpO2   11/23/23 1522 (!) 128/96 -- -- 84 -- 99 %   11/23/23 1400 (!) 141/97 -- --  79 -- 95 %   11/23/23 1345 128/84 -- -- -- -- 100 %   11/23/23 1315 119/82 -- -- -- -- 100 %   11/23/23 1300 131/84 -- -- 70 -- 91 %   11/23/23 1245 128/83 -- -- -- -- 100 %   11/23/23 1220 137/79 -- -- 72 -- 100 %   11/23/23 1103 126/89 97.7  F (36.5  C) Temporal 79 20 100 %        Physical Exam  General: Alert, appears well-developed and well-nourished. Cooperative.     In moderate distress, occasionally tearful.  HEENT:  Head:  Atraumatic  Ears:  External ears are normal  Eyes:   Conjunctivae normal and EOM are normal. No scleral icterus.    Pupils are equal, round, and reactive to light.   Neck:   Normal range of motion. Neck supple.  CV:  Normal rate, regular rhythm, normal heart sounds and radial and dorsalis pedis pulses are 2+ and symmetric.  No murmur.  Resp:  Breath sounds are clear bilaterally    Non-labored, no retractions or accessory muscle use  GI:  TTP over RLQ. Mild right CVA tenderness. Abdomen is soft, no distension. No rebound or guarding.  Rectal:  There is an external thrombosed hemorrhoid, no active bleeding. Internal hemorrhoid noted at the 12 o'clock position. No other identifiable masses.   MS:  Normal range of motion. No edema.    Normal strength in all 4 extremities.     Back atraumatic.    No midline cervical, thoracic, or lumbar tenderness  Skin:  Warm and dry.  No rash or lesions noted.  Neuro:   Alert. Normal strength.  Sensation intact in all 4 extremities.  Psych: Normal mood and affect.    Emergency Department Course   Laboratory:  Labs Ordered and Resulted from Time of ED Arrival to Time of ED Departure   CBC WITH PLATELETS AND DIFFERENTIAL - Abnormal       Result Value    WBC Count 7.3      RBC Count 5.33 (*)     Hemoglobin 16.1 (*)     Hematocrit 48.9 (*)     MCV 92      MCH 30.2      MCHC 32.9      RDW 13.2      Platelet Count 310      % Neutrophils 76      % Lymphocytes 15      % Monocytes 9      % Eosinophils 0      % Basophils 0      % Immature Granulocytes 0      NRBCs per  100 WBC 0      Absolute Neutrophils 5.5      Absolute Lymphocytes 1.1      Absolute Monocytes 0.6      Absolute Eosinophils 0.0      Absolute Basophils 0.0      Absolute Immature Granulocytes 0.0      Absolute NRBCs 0.0     ROUTINE UA WITH MICROSCOPIC REFLEX TO CULTURE - Abnormal    Color Urine Light Yellow      Appearance Urine Clear      Glucose Urine Negative      Bilirubin Urine Negative      Ketones Urine 10 (*)     Specific Gravity Urine 1.012      Blood Urine Trace (*)     pH Urine 5.0      Protein Albumin Urine Negative      Urobilinogen Urine Normal      Nitrite Urine Negative      Leukocyte Esterase Urine Negative      Mucus Urine Present (*)     RBC Urine <1      WBC Urine 1     BASIC METABOLIC PANEL - Normal    Sodium 138      Potassium 4.4      Chloride 102      Carbon Dioxide (CO2) 26      Anion Gap 10      Urea Nitrogen 11.7      Creatinine 0.74      GFR Estimate >90      Calcium 9.9      Glucose 91     TYPE AND SCREEN, ADULT    ABO/RH(D) O POS      Antibody Screen Negative      SPECIMEN EXPIRATION DATE [id number]     ABO/RH TYPE AND SCREEN        Procedures   None    Emergency Department Course & Assessments:    Interventions:  Medications   oxyCODONE (ROXICODONE) tablet 5 mg (5 mg Oral $Given 11/23/23 1220)        Assessments:  1400: Initial evaluation and assessment.    Independent Interpretation (X-rays, CTs, rhythm strip):  None    Consultations/Discussion of Management or Tests:  Patient was seen in conjunction with Dr. Ortiz.     Social Determinants of Health affecting care:   None    Disposition:  The patient was discharged to home.     Impression & Plan    CMS Diagnoses: None    Medical Decision Making:  Renny Cummins is a 58 year old female with a history of abdominal pain, rectal pain, anxiety, depression who presents to the emergency department for rectal/perineal pain.  The patient is overall well-appearing and is noted to have right lower quadrant abdominal tenderness  and a thrombosed hemorrhoid on rectal exam with likely an internal hemorrhoid as well.  The patient had right lower quadrant abdominal tenderness at the previous emergency room visit at which time she had a CT obtained which showed no acute findings.  She is also previously had an appendectomy.  Vital signs notable for mild hypertension without fever or tachycardia.  UA shows no evidence of infection.  Blood work shows no leukocytosis, anemia, or electrolyte abnormalities.  Kidney function is normal.  Given the patient recently had a CT scan obtained for evaluation of these symptoms about 3 months ago, which did not show any acute pathology, we did not proceed with repeat advanced imaging today.  The patient's exam, vital signs, and blood work did not show any concerning signs of infection or other acute process.  For management of symptoms we recommended treatment with topical hydrocortisone and lidocaine with MiraLAX for the hemorrhoids and provided a new prescription for omeprazole as this improved her symptoms previously.  We advised the patient to follow-up with her primary care provider and/or colorectal for ongoing management of symptoms.  She was advised to return to the ED for fevers, worsening pain, vomiting, blood in the stool, melena, or any other new concerns.  The patient was in agreement with this plan all questions were answered.      Diagnosis:    ICD-10-CM    1. Abdominal pain  R10.9       2. Rectal pain  K62.89       3. External hemorrhoids  K64.4            Discharge Medications:  Discharge Medication List as of 11/23/2023  3:15 PM        START taking these medications    Details   hydrocortisone, Perianal, (HYDROCORTISONE) 2.5 % cream Place rectally 2 times daily as needed for hemorrhoidsDisp-30 g, R-0Local Print      lidocaine (XYLOCAINE) 5 % external ointment Apply topically every 6 hours as needed (over hemorrhoids for rectal pain)Disp-30 g, R-0Local Print      omeprazole (PRILOSEC) 20 MG   capsule Take 1 capsule (20 mg) by mouth daily, Disp-40 capsule, R-0, Local Print              11/23/2023   Jammie Stewart PA-C, Carley J, PA-C  11/23/23 2258

## 2023-11-23 NOTE — ED NOTES
"PIT/Triage Evaluation    Patient presented with rectal pain. Reports she has had left lower quadrant pain as well as mucous-like stool. She has had this issue for the past 6 months and has seen multiple doctors for this issue; she has been unable to figure out what is going on. It has been worsening, there is now a bulge in the rectum. She was referred to get an endoscopy but has been unable to due to the cost. Endorses some diarrhea with irregular stool pattern. Reports flatulence and a \"burning\" sensation in the stomach with acid reflux. She has taken ibuprofen today.        Exam is notable for:    Patient Vitals for the past 24 hrs:   BP Temp Temp src Pulse Resp SpO2   11/23/23 1103 126/89 97.7  F (36.5  C) Temporal 79 20 100 %     General:  Alert, interactive, appears very uncomfortable  Cardiovascular:  Well perfused  Lungs:  No respiratory distress, no accessory muscle use  Neuro:  Moving all 4 extremities  Skin:  Warm, dry  Psych:  Normal affect  Abd: mild RLQ pain    Appropriate interventions for symptom management were initiated if applicable.  Appropriate diagnostic tests were initiated if indicated.    Important information for subsequent clinician:  Patient reports to have been being evaluated numerous times for this chronic lower abdominal pain.  She now feels a bulge in the rectum.  Basic labs and pain meds ordered.  Needs a rectal exam to determine further assessment.    I briefly evaluated the patient and developed an initial plan of care. I discussed this plan and explained that this brief interaction does not constitute a full evaluation. Patient/family understands that they should wait to be fully evaluated and discuss any test results with another clinician prior to leaving the hospital.     Lynne Ortiz MD  11/23/23 1201    "

## 2023-11-23 NOTE — ED NOTES
Patient discharged home . Vital signs stable at discharge. Education provided regarding AVS reviewed via . Pt verbalized understanding. IV removed. Catheter intact. All questions answered.

## 2023-11-23 NOTE — DISCHARGE INSTRUCTIONS
-Follow up with your family medicine provider within the next week for reevaluation and ongoing management  -Return to ED for fevers, worsening pain, bloody stool, or any other new concerns

## 2023-11-23 NOTE — ED PROVIDER NOTES
"ED ATTENDING PHYSICIAN NOTE:   I evaluated this patient in conjunction with Jammie Stewart PA-C  I have participated in the care of the patient and personally performed key elements of the history, exam, and medical decision making.      HPI:   Renny Cummins is a 58 year old female presenting  with rectal pain. Reports she has had left lower quadrant pain as well as mucous-like stool. She has had this issue for the past 6 months and has seen multiple doctors for this issue. It has been worsening. She was referred to get an endoscopy but has been unable to due to the cost. Endorses some diarrhea with irregular stool pattern. Reports flatulence and a \"burning\" sensation in the stomach with acid reflux. She has taken ibuprofen today.       Independent Historian:   None - Patient Only    Review of External Notes: Reviewed recent PCP note from 11/2/2023.  Reviewed colorectal notes as well from call back regarding hemorrhoid pain.     EXAM:   General: Resting on the bed.  Head: No obvious trauma to head.  Ears, Nose, Throat:  External ears normal.  Nose normal.   Eyes:  Conjunctivae clear.  CV: Regular rate and rhythm.  No murmurs.      Respiratory: Effort normal and breath sounds normal.  No wheezing or crackles.   Gastrointestinal: Soft.  No distension. There is mild RLQ tenderness.  There is no rigidity, no rebound and no guarding.   Musculoskeletal: no cva tenderness  Neuro: Alert. Moving all extremities appropriately.  Normal speech.    Skin: Skin is warm and dry.  No rash noted.   Rectal: external exam with skin tags noted on the 3 o'clock position and large external hemorrhoid noted at the 6-9 o'clock position     Labs  Labs Ordered and Resulted from Time of ED Arrival to Time of ED Departure   CBC WITH PLATELETS AND DIFFERENTIAL - Abnormal       Result Value    WBC Count 7.3      RBC Count 5.33 (*)     Hemoglobin 16.1 (*)     Hematocrit 48.9 (*)     MCV 92      MCH 30.2      MCHC 32.9      RDW " 13.2      Platelet Count 310      % Neutrophils 76      % Lymphocytes 15      % Monocytes 9      % Eosinophils 0      % Basophils 0      % Immature Granulocytes 0      NRBCs per 100 WBC 0      Absolute Neutrophils 5.5      Absolute Lymphocytes 1.1      Absolute Monocytes 0.6      Absolute Eosinophils 0.0      Absolute Basophils 0.0      Absolute Immature Granulocytes 0.0      Absolute NRBCs 0.0     ROUTINE UA WITH MICROSCOPIC REFLEX TO CULTURE - Abnormal    Color Urine Light Yellow      Appearance Urine Clear      Glucose Urine Negative      Bilirubin Urine Negative      Ketones Urine 10 (*)     Specific Gravity Urine 1.012      Blood Urine Trace (*)     pH Urine 5.0      Protein Albumin Urine Negative      Urobilinogen Urine Normal      Nitrite Urine Negative      Leukocyte Esterase Urine Negative      Mucus Urine Present (*)     RBC Urine <1      WBC Urine 1     BASIC METABOLIC PANEL - Normal    Sodium 138      Potassium 4.4      Chloride 102      Carbon Dioxide (CO2) 26      Anion Gap 10      Urea Nitrogen 11.7      Creatinine 0.74      GFR Estimate >90      Calcium 9.9      Glucose 91     TYPE AND SCREEN, ADULT    ABO/RH(D) O POS      Antibody Screen Negative      SPECIMEN EXPIRATION DATE [id number]     ABO/RH TYPE AND SCREEN       Independent Interpretation (X-rays, CTs, rhythm strip):  None    Consultations/Discussion of Management or Tests:  None     Social Determinants of Health affecting care:   Healthcare Access/Compliance reports difficulty accessing colorectal surgery given issues with insurance     MEDICAL DECISION MAKING/ASSESSMENT AND PLAN:   58-year-old female presents to the ER with ongoing abdominal pain and rectal discomfort.  Broad differentials pursued include not limited to hemorrhoid, fissure, abscess, colitis, diverticulitis, obstruction, perforation, appendicitis, etc.  Overall patient is well-appearing nontoxic.  CBC without leukocytosis or anemia.  BMP without acute electrolyte,  metabolic or renal dysfunction.  Urinalysis unremarkable no evidence of acute infectious etiology.  Type and screen are normal.  Exam is reassuring she has some very mild right lower quadrant tenderness but has had recent CTs that were unremarkable.  She has been seen by numerous physicians in the last several months for this ongoing pain and discomfort.  This does not appear to be new or acute.  I have low suspicion for acute surgical process that would require repeat CT scan today.  Her chief complaint is rectal pain.  On examination she does have some skin tags as well as a large external hemorrhoid.  This is greater than 72 hours old therefore no indication for I&D from the ER.  There is no evidence of perirectal abscess or perianal abscess.  No suggestion of fissure.  Overall this seems most consistent with external hemorrhoid pain.  We have recommended colorectal surgery and have stressed the importance of this for follow-up.  Will prescribe medications for comfort.  Return precautions were provided and patient was discharged home.     DIAGNOSIS:     ICD-10-CM    1. Abdominal pain  R10.9       2. Rectal pain  K62.89       3. External hemorrhoids  K64.4           DISPOSITION:   Discharge     11/23/2023  [facility] EMERGENCY DEPT     Lynne Ortiz MD  11/23/23 9850

## 2023-11-23 NOTE — ED TRIAGE NOTES
"Pt presents to the ED with complaint of rectal pain 10/10. Pt states she's been here before and been seen for \"a ball of blood\" in her rectum. Pt states she has mucus in her stool but denies blood. Pt states her rectal pain has been bad for 3 weeks and stools have been mucus for 3 months. Pt also complains about chronic LRQ pain, nausea, and headache. She states these problems have been going on for a long time and she has a history of h pylori.      Triage Assessment (Adult)       Row Name 11/23/23 1100          Triage Assessment    Airway WDL WDL        Respiratory WDL    Respiratory WDL WDL        Skin Circulation/Temperature WDL    Skin Circulation/Temperature WDL WDL        Cardiac WDL    Cardiac WDL WDL        Peripheral/Neurovascular WDL    Peripheral Neurovascular WDL WDL        Cognitive/Neuro/Behavioral WDL    Cognitive/Neuro/Behavioral WDL WDL                     "

## 2024-02-23 ENCOUNTER — TELEPHONE (OUTPATIENT)
Dept: FAMILY MEDICINE | Facility: CLINIC | Age: 59
End: 2024-02-23
Payer: COMMERCIAL

## 2024-02-23 NOTE — TELEPHONE ENCOUNTER
Patient Quality Outreach    Patient is due for the following:   Colon Cancer Screening  Breast Cancer Screening - Mammogram  Cervical Cancer Screening - PAP Needed  Depression  -  PHQ-9 needed  Physical Preventive Adult Physical      Topic Date Due    Hepatitis B Vaccine (1 of 3 - 19+ 3-dose series) Never done    Diptheria Tetanus Pertussis (DTAP/TDAP/TD) Vaccine (1 - Tdap) Never done    Zoster (Shingles) Vaccine (1 of 2) Never done    Flu Vaccine (1) Never done    COVID-19 Vaccine (1 - 2023-24 season) Never done       Next Steps:   Schedule a office visit for mammogram and Adult Preventative    Type of outreach:    Sent [employer] message.    Next Steps:  Reach out within 90 days via Phone.    Max number of attempts reached: No. Will try again in 90 days if patient still on fail list.    Questions for provider review:    None           Smita Reeder MA

## 2024-05-13 ENCOUNTER — APPOINTMENT (OUTPATIENT)
Dept: CT IMAGING | Facility: CLINIC | Age: 59
End: 2024-05-13
Attending: EMERGENCY MEDICINE
Payer: COMMERCIAL

## 2024-05-13 ENCOUNTER — HOSPITAL ENCOUNTER (EMERGENCY)
Facility: CLINIC | Age: 59
Discharge: HOME | End: 2024-05-13
Attending: EMERGENCY MEDICINE | Admitting: EMERGENCY MEDICINE
Payer: COMMERCIAL

## 2024-05-13 VITALS
RESPIRATION RATE: 18 BRPM | HEART RATE: 64 BPM | DIASTOLIC BLOOD PRESSURE: 64 MMHG | OXYGEN SATURATION: 99 % | TEMPERATURE: 98.4 F | SYSTOLIC BLOOD PRESSURE: 122 MMHG

## 2024-05-13 DIAGNOSIS — R10.9 RIGHT-SIDED ABDOMINAL PAIN OF UNKNOWN CAUSE: ICD-10-CM

## 2024-05-13 DIAGNOSIS — R30.0 DYSURIA: ICD-10-CM

## 2024-05-13 LAB
ALBUMIN SERPL BCG-MCNC: 4.2 G/DL (ref 3.5–5.2)
ALBUMIN UR-MCNC: 10 MG/DL
ALP SERPL-CCNC: 75 U/L (ref 40–150)
ALT SERPL W P-5'-P-CCNC: 15 U/L (ref 0–50)
ANION GAP SERPL CALCULATED.3IONS-SCNC: 9 MMOL/L (ref 7–15)
APPEARANCE UR: CLEAR
AST SERPL W P-5'-P-CCNC: 24 U/L (ref 0–45)
BASOPHILS # BLD AUTO: 0 10E3/UL (ref 0–0.2)
BASOPHILS NFR BLD AUTO: 0 %
BILIRUB SERPL-MCNC: 0.2 MG/DL
BILIRUB UR QL STRIP: NEGATIVE
BUN SERPL-MCNC: 16.3 MG/DL (ref 8–23)
CALCIUM SERPL-MCNC: 9.6 MG/DL (ref 8.6–10)
CHLORIDE SERPL-SCNC: 104 MMOL/L (ref 98–107)
COLOR UR AUTO: YELLOW
CREAT SERPL-MCNC: 0.82 MG/DL (ref 0.51–0.95)
DEPRECATED HCO3 PLAS-SCNC: 26 MMOL/L (ref 22–29)
EGFRCR SERPLBLD CKD-EPI 2021: 82 ML/MIN/1.73M2
EOSINOPHIL # BLD AUTO: 0.1 10E3/UL (ref 0–0.7)
EOSINOPHIL NFR BLD AUTO: 1 %
ERYTHROCYTE [DISTWIDTH] IN BLOOD BY AUTOMATED COUNT: 13.2 % (ref 10–15)
GLUCOSE SERPL-MCNC: 111 MG/DL (ref 70–99)
GLUCOSE UR STRIP-MCNC: NEGATIVE MG/DL
HCT VFR BLD AUTO: 44.7 % (ref 35–47)
HGB BLD-MCNC: 14.5 G/DL (ref 11.7–15.7)
HGB UR QL STRIP: ABNORMAL
HOLD SPECIMEN: NORMAL
HOLD SPECIMEN: NORMAL
IMM GRANULOCYTES # BLD: 0 10E3/UL
IMM GRANULOCYTES NFR BLD: 0 %
KETONES UR STRIP-MCNC: NEGATIVE MG/DL
LEUKOCYTE ESTERASE UR QL STRIP: ABNORMAL
LYMPHOCYTES # BLD AUTO: 1.5 10E3/UL (ref 0.8–5.3)
LYMPHOCYTES NFR BLD AUTO: 23 %
MCH RBC QN AUTO: 29.9 PG (ref 26.5–33)
MCHC RBC AUTO-ENTMCNC: 32.4 G/DL (ref 31.5–36.5)
MCV RBC AUTO: 92 FL (ref 78–100)
MONOCYTES # BLD AUTO: 0.7 10E3/UL (ref 0–1.3)
MONOCYTES NFR BLD AUTO: 11 %
MUCOUS THREADS #/AREA URNS LPF: PRESENT /LPF
NEUTROPHILS # BLD AUTO: 4.1 10E3/UL (ref 1.6–8.3)
NEUTROPHILS NFR BLD AUTO: 65 %
NITRATE UR QL: NEGATIVE
NRBC # BLD AUTO: 0 10E3/UL
NRBC BLD AUTO-RTO: 0 /100
PH UR STRIP: 5.5 [PH] (ref 5–7)
PLATELET # BLD AUTO: 317 10E3/UL (ref 150–450)
POTASSIUM SERPL-SCNC: 4.3 MMOL/L (ref 3.4–5.3)
PROT SERPL-MCNC: 7.6 G/DL (ref 6.4–8.3)
RBC # BLD AUTO: 4.85 10E6/UL (ref 3.8–5.2)
RBC URINE: 1 /HPF
SODIUM SERPL-SCNC: 139 MMOL/L (ref 135–145)
SP GR UR STRIP: 1.03 (ref 1–1.03)
SQUAMOUS EPITHELIAL: <1 /HPF
UROBILINOGEN UR STRIP-MCNC: NORMAL MG/DL
WBC # BLD AUTO: 6.4 10E3/UL (ref 4–11)
WBC URINE: 3 /HPF

## 2024-05-13 PROCEDURE — 74177 CT ABD & PELVIS W/CONTRAST: CPT

## 2024-05-13 PROCEDURE — 250N000011 HC RX IP 250 OP 636: Performed by: EMERGENCY MEDICINE

## 2024-05-13 PROCEDURE — 99285 EMERGENCY DEPT VISIT HI MDM: CPT | Mod: 25

## 2024-05-13 PROCEDURE — 250N000009 HC RX 250: Performed by: EMERGENCY MEDICINE

## 2024-05-13 PROCEDURE — 82040 ASSAY OF SERUM ALBUMIN: CPT | Performed by: EMERGENCY MEDICINE

## 2024-05-13 PROCEDURE — 85025 COMPLETE CBC W/AUTO DIFF WBC: CPT | Performed by: EMERGENCY MEDICINE

## 2024-05-13 PROCEDURE — 81001 URINALYSIS AUTO W/SCOPE: CPT | Performed by: EMERGENCY MEDICINE

## 2024-05-13 PROCEDURE — 36415 COLL VENOUS BLD VENIPUNCTURE: CPT | Performed by: EMERGENCY MEDICINE

## 2024-05-13 RX ORDER — IOPAMIDOL 755 MG/ML
500 INJECTION, SOLUTION INTRAVASCULAR ONCE
Status: COMPLETED | OUTPATIENT
Start: 2024-05-13 | End: 2024-05-13

## 2024-05-13 RX ORDER — ONDANSETRON 4 MG/1
4 TABLET, ORALLY DISINTEGRATING ORAL EVERY 8 HOURS PRN
Qty: 10 TABLET | Refills: 0 | Status: SHIPPED | OUTPATIENT
Start: 2024-05-13 | End: 2024-05-16

## 2024-05-13 RX ORDER — ONDANSETRON 4 MG/1
4 TABLET, ORALLY DISINTEGRATING ORAL
Status: DISCONTINUED | OUTPATIENT
Start: 2024-05-13 | End: 2024-05-13 | Stop reason: HOSPADM

## 2024-05-13 RX ADMIN — IOPAMIDOL 71 ML: 755 INJECTION, SOLUTION INTRAVENOUS at 16:32

## 2024-05-13 RX ADMIN — SODIUM CHLORIDE 58 ML: 9 INJECTION, SOLUTION INTRAVENOUS at 16:32

## 2024-05-13 ASSESSMENT — COLUMBIA-SUICIDE SEVERITY RATING SCALE - C-SSRS
1. IN THE PAST MONTH, HAVE YOU WISHED YOU WERE DEAD OR WISHED YOU COULD GO TO SLEEP AND NOT WAKE UP?: NO
6. HAVE YOU EVER DONE ANYTHING, STARTED TO DO ANYTHING, OR PREPARED TO DO ANYTHING TO END YOUR LIFE?: NO
2. HAVE YOU ACTUALLY HAD ANY THOUGHTS OF KILLING YOURSELF IN THE PAST MONTH?: NO

## 2024-05-13 ASSESSMENT — ACTIVITIES OF DAILY LIVING (ADL)
ADLS_ACUITY_SCORE: 35
ADLS_ACUITY_SCORE: 35

## 2024-05-13 NOTE — ED TRIAGE NOTES
Pt arrives with complaint of lower right quadrant / right groin for one week. Also concerned with subjective fevers. Ibuprofen before coming today. Denies vomiting and diarrhea, endorses nausea and dysuria. A&Ox4.

## 2024-05-13 NOTE — Clinical Note
Renny Cummins was seen and treated in our emergency department on 5/13/2024.  She may return to work on 05/15/2024.       If you have any questions or concerns, please don't hesitate to call.      Laquita Baltazar MD

## 2024-05-13 NOTE — ED PROVIDER NOTES
Emergency Department Note      History of Present Illness     Chief Complaint  Abdominal Pain and Dysuria    HPI  Anneliece TAYLOR Cummins is a 58 year old female who presents for intermittent lower abdominal pain for approximately 1 year that is not improving. Patient reports she has been seen for this a few times in the ED and clinics, but cannot find relief. She describes her abdominal pain as a burning pain. Also notes an episode of fever of 102.2 one week ago, nausea, bloating, recent weight loss and burning with urination for approximately one month. Denies vaginal bleeding, vomiting or bloody stools.     Independent Historian  None    Review of External Notes  I reviewed imaging from 8/29/2023.   Past Medical History   Medical History and Problem List  The patient currently denies a past medical history.    Medications  Xanax  Prozac  Atarax  Prilosec    Surgical History   Cholecystectomy  Hysterectomy    Physical Exam   Patient Vitals for the past 24 hrs:   BP Temp Temp src Pulse Resp SpO2   05/13/24 1736 122/64 -- -- 64 18 99 %   05/13/24 1508 108/68 98.4  F (36.9  C) Oral 68 18 99 %     Physical Exam  General: Adult female sitting upright  Eyes: PERRL, Conjunctive within normal limits.  No scleral icterus.  ENT: Moist mucous membranes, oropharynx clear.   CV: Normal S1S2, no murmur, rub or gallop. Regular rate and rhythm  Resp: Clear to auscultation bilaterally, no wheezes, rales or rhonchi. Normal respiratory effort.  GI: Abdomen is soft and distended.  Right lower quadrant tenderness to palpation.  No palpable masses. No rebound or guarding.  No CVA tenderness to percussion.  MSK: No edema. Nontender. Normal active range of motion.  Skin: Warm and dry. No rashes or lesions or ecchymoses on visible skin.  Neuro: Alert and oriented. Responds appropriately to all questions and commands. No focal findings appreciated. Normal muscle tone.  Psych: Normal mood and affect. Pleasant.   Diagnostics   Lab  Results   Labs Ordered and Resulted from Time of ED Arrival to Time of ED Departure   COMPREHENSIVE METABOLIC PANEL - Abnormal       Result Value    Sodium 139      Potassium 4.3      Carbon Dioxide (CO2) 26      Anion Gap 9      Urea Nitrogen 16.3      Creatinine 0.82      GFR Estimate 82      Calcium 9.6      Chloride 104      Glucose 111 (*)     Alkaline Phosphatase 75      AST 24      ALT 15      Protein Total 7.6      Albumin 4.2      Bilirubin Total 0.2     ROUTINE UA WITH MICROSCOPIC REFLEX TO CULTURE - Abnormal    Color Urine Yellow      Appearance Urine Clear      Glucose Urine Negative      Bilirubin Urine Negative      Ketones Urine Negative      Specific Gravity Urine 1.031      Blood Urine Trace (*)     pH Urine 5.5      Protein Albumin Urine 10 (*)     Urobilinogen Urine Normal      Nitrite Urine Negative      Leukocyte Esterase Urine Trace (*)     Mucus Urine Present (*)     RBC Urine 1      WBC Urine 3      Squamous Epithelials Urine <1     CBC WITH PLATELETS AND DIFFERENTIAL    WBC Count 6.4      RBC Count 4.85      Hemoglobin 14.5      Hematocrit 44.7      MCV 92      MCH 29.9      MCHC 32.4      RDW 13.2      Platelet Count 317      % Neutrophils 65      % Lymphocytes 23      % Monocytes 11      % Eosinophils 1      % Basophils 0      % Immature Granulocytes 0      NRBCs per 100 WBC 0      Absolute Neutrophils 4.1      Absolute Lymphocytes 1.5      Absolute Monocytes 0.7      Absolute Eosinophils 0.1      Absolute Basophils 0.0      Absolute Immature Granulocytes 0.0      Absolute NRBCs 0.0       Imaging  CT Abdomen Pelvis w Contrast   Final Result   IMPRESSION:    1.  No findings to explain the patient's symptoms. No inflammatory process, hydronephrosis or bowel obstruction.   2.  Nonvisualized appendix. No secondary signs of appendicitis.   3.  Stable indeterminate but likely benign liver dome lesion, possibly a cavernous hemangioma.        Independent Interpretation  None  ED Course     Medications Administered  Medications   ondansetron (ZOFRAN ODT) ODT tab 4 mg (has no administration in time range)   iopamidol (ISOVUE-370) solution 500 mL (71 mLs Intravenous $Given 5/13/24 1632)   CT Scan Flush (58 mLs Intravenous $Given 5/13/24 1632)       Social Determinants of Health adding to complexity of care  None    ED Course  ED Course as of 05/13/24 1750   Mon May 13, 2024   1536 I obtained history and examined the patient as noted above.     1749 I rechecked the patient and explained findings. We discussed plan for discharge and patient is in agreement with plan.        Medical Decision Making / Diagnosis   MDM  Renny Cummins is a 58 year old female who presents emergency department with acute on chronic right lower quadrant abdominal pain.  She has been evaluated for the same in the past with no clear cause.  She denies any recent abdominal imaging reports the pain is more severe today and is now associated with mild dysuria.  She denies any vaginal symptoms.  She is seeing GYN and assessment as well.  Fortunately, laboratory and CT imaging do not show any acute surgical or life-threatening cause for symptoms.  There is no clear cause at all for her symptoms.  She is offered reassurance but also recommended follow-up further with her primary care doctor and possible gastroenterologist as her does seem to be potentially a GI cause for her symptoms.  Given her overall well appearance at this time and reassuring evaluation I feel comfortable discharging her home.  She is recommended return to the emergency department for fever, increasing pain, uncontrolled vomiting, etc.  All questions were answered prior to discharge.    Disposition  The patient was discharged.     ICD-10 Codes:    ICD-10-CM    1. Right-sided abdominal pain of unknown cause  R10.9 Adult GI  Referral - Consult Only      2. Dysuria  R30.0 Adult GI  Referral - Consult Only           Discharge  Medications  Discharge Medication List as of 5/13/2024  6:00 PM        START taking these medications    Details   ondansetron (ZOFRAN ODT) 4 MG ODT tab Take 1 tablet (4 mg) by mouth every 8 hours as needed for nausea or vomiting, Disp-10 tablet, R-0, E-Prescribe               Scribe Disclosure:  I, Nancy Walker, am serving as a scribe at 3:46 PM on 5/13/2024 to document services personally performed by Laquita Baltazar MD based on my observations and the provider's statements to me.     [employer]      Laquita Baltazar MD  05/13/24 3313

## 2024-05-24 ENCOUNTER — TELEPHONE (OUTPATIENT)
Dept: GASTROENTEROLOGY | Facility: CLINIC | Age: 59
End: 2024-05-24

## 2024-05-24 ENCOUNTER — OFFICE VISIT (OUTPATIENT)
Dept: FAMILY MEDICINE | Facility: CLINIC | Age: 59
End: 2024-05-24
Payer: COMMERCIAL

## 2024-05-24 VITALS
WEIGHT: 130.9 LBS | TEMPERATURE: 98.7 F | RESPIRATION RATE: 14 BRPM | DIASTOLIC BLOOD PRESSURE: 66 MMHG | HEART RATE: 66 BPM | OXYGEN SATURATION: 100 % | BODY MASS INDEX: 24.72 KG/M2 | HEIGHT: 61 IN | SYSTOLIC BLOOD PRESSURE: 101 MMHG

## 2024-05-24 DIAGNOSIS — G47.00 INSOMNIA, UNSPECIFIED TYPE: ICD-10-CM

## 2024-05-24 DIAGNOSIS — N89.8 VAGINAL DRYNESS: ICD-10-CM

## 2024-05-24 DIAGNOSIS — R10.31 RLQ ABDOMINAL PAIN: ICD-10-CM

## 2024-05-24 DIAGNOSIS — R10.13 EPIGASTRIC PAIN: Primary | ICD-10-CM

## 2024-05-24 DIAGNOSIS — N94.89 VAGINAL BURNING: ICD-10-CM

## 2024-05-24 PROBLEM — R19.7 DIARRHEA: Status: ACTIVE | Noted: 2023-10-10

## 2024-05-24 PROBLEM — K62.89 RECTAL PAIN: Status: ACTIVE | Noted: 2023-10-10

## 2024-05-24 PROBLEM — Z90.710 H/O: HYSTERECTOMY: Status: ACTIVE | Noted: 2023-10-10

## 2024-05-24 PROBLEM — Z90.49 HISTORY OF CHOLECYSTECTOMY: Status: ACTIVE | Noted: 2023-10-10

## 2024-05-24 PROBLEM — R10.84 ABDOMINAL PAIN, GENERALIZED: Status: ACTIVE | Noted: 2023-10-10

## 2024-05-24 LAB
CLUE CELLS: NORMAL
TRICHOMONAS, WET PREP: NORMAL
WBC'S/HIGH POWER FIELD, WET PREP: NORMAL
YEAST, WET PREP: NORMAL

## 2024-05-24 PROCEDURE — G2211 COMPLEX E/M VISIT ADD ON: HCPCS | Performed by: FAMILY MEDICINE

## 2024-05-24 PROCEDURE — 87491 CHLMYD TRACH DNA AMP PROBE: CPT | Performed by: FAMILY MEDICINE

## 2024-05-24 PROCEDURE — 87591 N.GONORRHOEAE DNA AMP PROB: CPT | Performed by: FAMILY MEDICINE

## 2024-05-24 PROCEDURE — 87210 SMEAR WET MOUNT SALINE/INK: CPT | Performed by: FAMILY MEDICINE

## 2024-05-24 PROCEDURE — 99214 OFFICE O/P EST MOD 30 MIN: CPT | Performed by: FAMILY MEDICINE

## 2024-05-24 RX ORDER — DICYCLOMINE HCL 20 MG
20 TABLET ORAL 4 TIMES DAILY PRN
Qty: 120 TABLET | Refills: 3 | Status: SHIPPED | OUTPATIENT
Start: 2024-05-24

## 2024-05-24 RX ORDER — DICYCLOMINE HCL 20 MG
20 TABLET ORAL 2 TIMES DAILY
COMMUNITY
End: 2024-05-24

## 2024-05-24 RX ORDER — HYDROCORTISONE ACETATE 25 MG/1
SUPPOSITORY RECTAL
COMMUNITY
Start: 2023-10-10

## 2024-05-24 RX ORDER — ONDANSETRON 4 MG/1
4 TABLET, ORALLY DISINTEGRATING ORAL
COMMUNITY
Start: 2023-10-10

## 2024-05-24 RX ORDER — TEMAZEPAM 7.5 MG/1
7.5-15 CAPSULE ORAL
Qty: 30 CAPSULE | Refills: 0 | Status: SHIPPED | OUTPATIENT
Start: 2024-05-24

## 2024-05-24 ASSESSMENT — ANXIETY QUESTIONNAIRES
IF YOU CHECKED OFF ANY PROBLEMS ON THIS QUESTIONNAIRE, HOW DIFFICULT HAVE THESE PROBLEMS MADE IT FOR YOU TO DO YOUR WORK, TAKE CARE OF THINGS AT HOME, OR GET ALONG WITH OTHER PEOPLE: SOMEWHAT DIFFICULT
7. FEELING AFRAID AS IF SOMETHING AWFUL MIGHT HAPPEN: NEARLY EVERY DAY
GAD7 TOTAL SCORE: 15
8. IF YOU CHECKED OFF ANY PROBLEMS, HOW DIFFICULT HAVE THESE MADE IT FOR YOU TO DO YOUR WORK, TAKE CARE OF THINGS AT HOME, OR GET ALONG WITH OTHER PEOPLE?: SOMEWHAT DIFFICULT
GAD7 TOTAL SCORE: 15
5. BEING SO RESTLESS THAT IT IS HARD TO SIT STILL: SEVERAL DAYS
2. NOT BEING ABLE TO STOP OR CONTROL WORRYING: NEARLY EVERY DAY
6. BECOMING EASILY ANNOYED OR IRRITABLE: SEVERAL DAYS
GAD7 TOTAL SCORE: 15
3. WORRYING TOO MUCH ABOUT DIFFERENT THINGS: SEVERAL DAYS
1. FEELING NERVOUS, ANXIOUS, OR ON EDGE: NEARLY EVERY DAY
7. FEELING AFRAID AS IF SOMETHING AWFUL MIGHT HAPPEN: NEARLY EVERY DAY
4. TROUBLE RELAXING: NEARLY EVERY DAY

## 2024-05-24 ASSESSMENT — PATIENT HEALTH QUESTIONNAIRE - PHQ9
SUM OF ALL RESPONSES TO PHQ QUESTIONS 1-9: 21
SUM OF ALL RESPONSES TO PHQ QUESTIONS 1-9: 21
10. IF YOU CHECKED OFF ANY PROBLEMS, HOW DIFFICULT HAVE THESE PROBLEMS MADE IT FOR YOU TO DO YOUR WORK, TAKE CARE OF THINGS AT HOME, OR GET ALONG WITH OTHER PEOPLE: SOMEWHAT DIFFICULT

## 2024-05-24 ASSESSMENT — PAIN SCALES - GENERAL: PAINLEVEL: WORST PAIN (10)

## 2024-05-24 NOTE — PROGRESS NOTES
Assessment & Plan     Epigastric pain  Will check for H. Pylori again, recommendations pending results  - Helicobacter pylori Antigen Stool; Future  - Helicobacter pylori Antigen Stool    RLQ abdominal pain  Continue Dicyclomine PRN, staff to assist with scheduling colonoscopy today.  - dicyclomine (BENTYL) 20 MG tablet; Take 1 tablet (20 mg) by mouth 4 times daily as needed (pain)    Vaginal burning  Will check for infection, no visual findings to suggest infection on exam.  - Wet prep - lab collect; Future  - Chlamydia trachomatis/Neisseria gonorrhoeae by PCR - Clinic Collect; Future  - Chlamydia trachomatis/Neisseria gonorrhoeae by PCR - Clinic Collect  - Wet prep - lab collect    Vaginal dryness  Suspect component of vaginal dryness/atrophic vaginitis at play regarding vaginal symptoms, but says the vaginal pain is not the same as the RLQ pains. Consider topical estrogen cream if normal.  - Wet prep - lab collect; Future  - Chlamydia trachomatis/Neisseria gonorrhoeae by PCR - Clinic Collect; Future  - Chlamydia trachomatis/Neisseria gonorrhoeae by PCR - Clinic Collect  - Wet prep - lab collect    Insomnia, unspecified type  Will try Temazepam for sleep, follow up if not helping.  - temazepam (RESTORIL) 7.5 MG capsule; Take 1-2 capsules (7.5-15 mg) by mouth nightly as needed for sleep    The longitudinal plan of care for the diagnosis(es)/condition(s) as documented were addressed during this visit. Due to the added complexity in care, I will continue to support Renny in the subsequent management and with ongoing continuity of care.    See Patient Instructions    Subjective   Anneliece is a 58 year old, presenting for the following health issues:  Abdominal Pain and Hospital F/U        5/24/2024     1:45 PM   Additional Questions   Roomed by Gala Musa   New medications                                                               dicyclomine 20 mg tablet   History of Present Illness       Reason for  "visit:  Abdominal pain    She eats 0-1 servings of fruits and vegetables daily.She consumes 0 sweetened beverage(s) daily.She exercises with enough effort to increase her heart rate 9 or less minutes per day.  She exercises with enough effort to increase her heart rate 3 or less days per week.   She is taking medications regularly.       PT reports she tried calling for GI referral, and kept getting transferred and was not able to schedule an appt.     ED/UC Followup:    Facility:  [facility] Emergency [facility].  Date of visit: 5/13/2024  Reason for visit: Abdominal pain and dysuria   Current Status: No change     Has been dealing with this problem for a couple years.  Everything that she eats she gets a lot of gas.  Wonders if she might have H. Pylori again. She points to the right lower abdomen, more of the pelvis, and says that this is the area of her pain. No problems with diarrhea.  History of constipation and hemorrhoids.  No back pain.  History of vaginal burning without discharge. Feels like the pain is in the vagina as well.  She saw an OBGYN, but says that they did not do an exam because she had the hysterectomy.  She is not currently sexually active, has not been in 4 years.  Does have vaginal dryness.    CT scan of abdomen was normal.  Appendix was not visualized and patient does not know if she has it still.  Total hysterectomy with oophorectomy.  She was given a referral to GI.    She was given Dicyclomine, which she says that did help a little with the pain.    She had hydroxyzine for sleep, but this did not help.  She used Alprazolam for sleep previously.        Objective    /66 (BP Location: Right arm, Patient Position: Sitting, Cuff Size: Adult Regular)   Pulse 66   Temp 98.7  F (37.1  C) (Oral)   Resp 14   Ht 1.549 m (5' 1\")   Wt 59.4 kg (130 lb 14.4 oz)   SpO2 100%   Breastfeeding No   BMI 24.73 kg/m    Body mass index is 24.73 kg/m .  Physical Exam   GENERAL: alert and no " distress  ABDOMEN: soft, mild tenderness to palpation of RLQ/suprapubic region   (female): normal female external genitalia and vaginal mucosal atrophy  RECTAL (female): normal sphincter tone, no rectal masses and external hemorrhoids    Labs and imaging reviewed in chart        Signed Electronically by: Donna Livingston MD

## 2024-05-24 NOTE — TELEPHONE ENCOUNTER
[facility] Call Center    Phone Message    May a detailed message be left on voicemail: Yes    Reason for Call: Other: Patient is currently scheduled on 8/1, as visit type New GI Urgent. This is outside the expected timeline for this referral. Patient has been added to the waitlist.        Scheduled per location preferences      Action Taken: Message routed to:  Other: GI REFERRAL TRIAGE POOL     Travel Screening: Not Applicable

## 2024-05-24 NOTE — LETTER
Liana 3, 2024      [address]le[address]  [address]   [place] [facility] [address]        Dear Ms.Pena Cummins,    We are writing to inform you of your test results.    Remaining H.Pylori testing negative.   Resulted Orders   Chlamydia trachomatis/Neisseria gonorrhoeae by PCR - Clinic Collect   Result Value Ref Range    Chlamydia Trachomatis Negative Negative      Comment:      Negative for C. trachomatis rRNA by transcription mediated amplification.   A negative result by transcription mediated amplification does not preclude the presence of infection because results are dependent on proper and adequate collection, absence of inhibitors and sufficient rRNA to be detected.    Neisseria gonorrhoeae Negative Negative      Comment:      Negative for N. gonorrhoeae rRNA by transcription mediated amplification. A negative result by transcription mediated amplification does not preclude the presence of C. trachomatis infection because results are dependent on proper and adequate collection, absence of inhibitors and sufficient rRNA to be detected.   Wet prep - lab collect   Result Value Ref Range    Trichomonas Absent Absent    Yeast Absent Absent    Clue Cells Absent Absent    WBCs/high power field None None   Helicobacter pylori Antigen Stool   Result Value Ref Range    Helicobacter pylori Antigen Stool Negative Negative      Comment:      Negative for Helicobacter pylori antigen by enzyme immunoassay. A negative result indicates the absence of H. pylori antigen or that the level of antigen is below the level of detection.     If you have any questions or concerns, please call the clinic at the number listed above.     Sincerely,    NAHED Navarro CNP (for Dr. COLE)

## 2024-05-25 LAB
C TRACH DNA SPEC QL PROBE+SIG AMP: NEGATIVE
N GONORRHOEA DNA SPEC QL NAA+PROBE: NEGATIVE

## 2024-05-28 DIAGNOSIS — N94.89 VAGINAL BURNING: ICD-10-CM

## 2024-05-28 DIAGNOSIS — N89.8 VAGINAL DRYNESS: Primary | ICD-10-CM

## 2024-05-28 RX ORDER — ESTRADIOL 0.1 MG/G
2 CREAM VAGINAL
Qty: 42.5 G | Refills: 4 | Status: SHIPPED | OUTPATIENT
Start: 2024-05-30

## 2024-05-29 ENCOUNTER — TELEPHONE (OUTPATIENT)
Dept: FAMILY MEDICINE | Facility: CLINIC | Age: 59
End: 2024-05-29
Payer: COMMERCIAL

## 2024-05-29 NOTE — TELEPHONE ENCOUNTER
Donna Doherty MD  P [facility]  Please call patient and let her know that her testing was negative for infections.  I recommend we do the topical estrogen cream we discussed at her visit for the vaginal dryness and burning. She will do the estrogen cream twice per week. Follow up in 3-6 months if not improving.

## 2024-05-30 ENCOUNTER — APPOINTMENT (OUTPATIENT)
Dept: INTERPRETER SERVICES | Facility: CLINIC | Age: 59
End: 2024-05-30
Payer: COMMERCIAL

## 2024-05-30 PROCEDURE — 87338 HPYLORI STOOL AG IA: CPT | Performed by: FAMILY MEDICINE

## 2024-05-30 NOTE — TELEPHONE ENCOUNTER
Placed call to patient using      Reviewed message below from Dr. Monica Livingston   Patient will  the estradiol vaginal cream at [facility] today   If symptoms do not improve will schedule visit in 3-6 months for follow up   If concerns arise will return call to discuss     Jodi Haider Registered Nurse  [facility]

## 2024-05-31 LAB — H PYLORI AG STL QL IA: NEGATIVE

## 2024-05-31 NOTE — TELEPHONE ENCOUNTER
Writer sent a [employer] message to inform the Pt that Pt is on the wait list for a sooner appointment. Writer will reach out to the Pt via phone call or "[employer]hart when a sooner appointment becomes available.

## 2024-06-20 NOTE — TELEPHONE ENCOUNTER
Writer and  called to offer Pt a sooner appointment with Nolvia Avilez on 6/21/2024 at 10 AM. Pt was unable to accept the appointment due to work.

## 2024-06-25 DIAGNOSIS — K64.4 EXTERNAL HEMORRHOID: ICD-10-CM

## 2024-06-25 DIAGNOSIS — K62.89 RECTAL PAIN: ICD-10-CM

## 2024-06-26 NOTE — TELEPHONE ENCOUNTER
REFERRAL INFORMATION:  Referring Provider:  Laquita Baltazar MD   Referring Clinic:  [facility]   Reason for Visit/Diagnosis:    Right-sided abdominal pain of unknown cause   R30.0 (ICD-10-CM) - Dysuria        FUTURE VISIT INFORMATION:  Appointment Date: 8/1/2024  Appointment Time: 6:45AM      NOTES STATUS DETAILS   OFFICE NOTE from Referring Provider Internal   Laquita Baltazar MD      HOSPITAL DISCHARGE SUMMARY/  ED VISITS Internal 5/13/2024 Right-sided abdominal pain of unknown cause     11/23/2023 Abdominal pain    MEDICATION LIST Internal         PERTINENT LABS Internal    IMAGING (CT, MRI, EGD, MRCP, Small Bowel Follow Through/SBT, MR/CT Enterography) Internal CT Abdomen Pelvis 5/13/2024, 8/29/2023

## 2024-07-18 ENCOUNTER — MYC MEDICAL ADVICE (OUTPATIENT)
Dept: GASTROENTEROLOGY | Facility: CLINIC | Age: 59
End: 2024-07-18
Payer: COMMERCIAL

## 2024-07-26 ENCOUNTER — APPOINTMENT (OUTPATIENT)
Dept: INTERPRETER SERVICES | Facility: CLINIC | Age: 59
End: 2024-07-26
Payer: COMMERCIAL

## 2024-07-26 NOTE — TELEPHONE ENCOUNTER
Called to remind patient of their upcoming appointment with our GI clinic, on Thurs Aug 1st at 7:00am with Hilaria Barroso PA-C. This appointment is scheduled as an in-person appt. Please arrive 15 minutes early to check in for your appointment. , if your appointment is virtual (video or telephone) you need to be in [place] for the visit. To reschedule or cancel patient to call [phone].    Sissy Gaitan

## 2024-07-28 ENCOUNTER — HEALTH MAINTENANCE LETTER (OUTPATIENT)
Age: 59
End: 2024-07-28

## 2024-08-01 ENCOUNTER — PRE VISIT (OUTPATIENT)
Dept: GASTROENTEROLOGY | Facility: CLINIC | Age: 59
End: 2024-08-01

## 2025-01-21 ENCOUNTER — TELEPHONE (OUTPATIENT)
Dept: FAMILY MEDICINE | Facility: CLINIC | Age: 60
End: 2025-01-21
Payer: COMMERCIAL

## 2025-01-21 NOTE — TELEPHONE ENCOUNTER
Patient Quality Outreach    Patient is due for the following:   Colon Cancer Screening  Breast Cancer Screening - Mammogram  Cervical Cancer Screening - PAP Needed  Depression  -  DAP  Physical Preventive Adult Physical      Topic Date Due    Diptheria Tetanus Pertussis (DTAP/TDAP/TD) Vaccine (1 - Tdap) Never done    Pneumococcal Vaccine (1 of 1 - PCV) Never done    Zoster (Shingles) Vaccine (1 of 2) Never done    Flu Vaccine (1) Never done    COVID-19 Vaccine (1 - 2024-25 season) Never done       Action(s) Taken:   Schedule a office visit for mammogram and  Adult Preventative    Type of outreach:    Sent [employer] message.    Questions for provider review:    None           Smita Reeder MA

## 2025-06-08 ENCOUNTER — HEALTH MAINTENANCE LETTER (OUTPATIENT)
Age: 60
End: 2025-06-08

## 2025-07-30 ENCOUNTER — OFFICE VISIT (OUTPATIENT)
Dept: FAMILY MEDICINE | Facility: CLINIC | Age: 60
End: 2025-07-30
Payer: COMMERCIAL

## 2025-07-30 VITALS
DIASTOLIC BLOOD PRESSURE: 72 MMHG | SYSTOLIC BLOOD PRESSURE: 112 MMHG | HEIGHT: 61 IN | WEIGHT: 165 LBS | TEMPERATURE: 98.1 F | BODY MASS INDEX: 31.15 KG/M2 | OXYGEN SATURATION: 97 % | HEART RATE: 60 BPM | RESPIRATION RATE: 15 BRPM

## 2025-07-30 DIAGNOSIS — R22.1 NECK SWELLING: Primary | ICD-10-CM

## 2025-07-30 PROCEDURE — 99213 OFFICE O/P EST LOW 20 MIN: CPT

## 2025-07-30 PROCEDURE — 3078F DIAST BP <80 MM HG: CPT

## 2025-07-30 PROCEDURE — 3074F SYST BP LT 130 MM HG: CPT

## 2025-07-30 ASSESSMENT — PATIENT HEALTH QUESTIONNAIRE - PHQ9
SUM OF ALL RESPONSES TO PHQ QUESTIONS 1-9: 2
SUM OF ALL RESPONSES TO PHQ QUESTIONS 1-9: 2
10. IF YOU CHECKED OFF ANY PROBLEMS, HOW DIFFICULT HAVE THESE PROBLEMS MADE IT FOR YOU TO DO YOUR WORK, TAKE CARE OF THINGS AT HOME, OR GET ALONG WITH OTHER PEOPLE: NOT DIFFICULT AT ALL

## 2025-07-30 NOTE — PROGRESS NOTES
"  Assessment & Plan     (R22.1) Neck swelling  (primary encounter diagnosis)  Comment: overall symptoms improved from previous visit. Patient having persistent neck swelling around collarbone area. Possible lymph node related? Patient has been lifting heavy weights at work, contributing? Shared decision to get US to evaluate. Will follow up on results and whether further direction is necessary. Patient currently taking Diclofenac as well as Ibuprofen. Discussed avoiding using use together. At this point can taper off these medications given improvement of symptoms. Patient fully understands and is agreeable with plan of care, at this point patient will follow up as needed unless acute concerns arise in the meantime.  Plan: US Head Neck Soft Tissue, CANCELED: US Head         Neck Soft Tissue    MED REC REQUIRED  Post Medication Reconciliation Status: discharge medications reconciled, continue medications without change  BMI  Estimated body mass index is 31.18 kg/m  as calculated from the following:    Height as of this encounter: 1.549 m (5' 1\").    Weight as of this encounter: 74.8 kg (165 lb).     Subjective   Anneliece is a 59 year old, presenting for the following health issues:  ER F/U        7/30/2025    11:19 AM   Additional Questions   Roomed by Gala ORTEZ     \[facility]\""        ED/UC Followup:    Facility:  [facility]  Date of visit: 7/19/2025  Reason for visit: Nonintractable headache, unspecified chronicity pattern, unspecified headache type    Current Status: intermittent headaches and nausea     -Does not have headaches currently   -no fever, chills, myalgias since UC visit  -Symptoms overall have improved but has swelling around lower neck/collarbone    Review of Systems  Constitutional, HEENT, cardiovascular, pulmonary, GI, , musculoskeletal, neuro, skin, endocrine and psych systems are negative, except as otherwise noted.      Objective    /72 (BP Location: Right arm, " "Patient Position: Sitting, Cuff Size: Adult Regular)   Pulse 60   Temp 98.1  F (36.7  C) (Temporal)   Resp 15   Ht 1.549 m (5' 1\")   Wt 74.8 kg (165 lb)   SpO2 97%   Breastfeeding No   BMI 31.18 kg/m    Body mass index is 31.18 kg/m .  Physical Exam  Vitals and nursing note reviewed.   Constitutional:       General: She is not in acute distress.     Appearance: Normal appearance. She is well-developed. She is not ill-appearing or toxic-appearing.   Neck:        Comments: Swelling noted to highlighted area.   Cardiovascular:      Rate and Rhythm: Normal rate and regular rhythm.      Heart sounds: No murmur heard.     No friction rub. No gallop.   Pulmonary:      Effort: Pulmonary effort is normal. No respiratory distress.      Breath sounds: No wheezing, rhonchi or rales.   Skin:     General: Skin is warm and dry.   Neurological:      Mental Status: She is alert.   Psychiatric:         Attention and Perception: Attention and perception normal.         Mood and Affect: Mood normal.         Speech: Speech normal.         Behavior: Behavior normal. Behavior is cooperative.         Thought Content: Thought content normal.         Judgment: Judgment normal.        Signed Electronically by: NAHED Navarro CNP    Answers submitted by the patient for this visit:  Patient Health Questionnaire (Submitted on 7/30/2025)  If you checked off any problems, how difficult have these problems made it for you to do your work, take care of things at home, or get along with other people?: Not difficult at all  PHQ9 TOTAL SCORE: 2    "

## 2025-07-30 NOTE — LETTER
July 30, 2025      Renny Cummins  [id number] [place] [id number]  [place] [id number]        To Whom It May Concern:    Renny Cummins  was seen on 7/30/2025.  Please excuse her today 7/30/2025 due to clinic visit.    Sincerely,        NAHED Navarro CNP    Electronically signed

## 2025-07-30 NOTE — PATIENT INSTRUCTIONS
Ultrasound ordered for neck swelling    Please drink more water, at least 60 oz daily to help stay hydrated     If insomnia is persistent issue would recommend following up w/ PCP or clinic on this to dedicate a visit for this.

## 2025-08-10 ENCOUNTER — HEALTH MAINTENANCE LETTER (OUTPATIENT)
Age: 60
End: 2025-08-10

## 2025-08-13 ENCOUNTER — HOSPITAL ENCOUNTER (OUTPATIENT)
Dept: ULTRASOUND IMAGING | Facility: CLINIC | Age: 60
Discharge: HOME | End: 2025-08-13
Payer: COMMERCIAL

## 2025-08-13 DIAGNOSIS — R22.1 NECK SWELLING: ICD-10-CM

## 2025-08-13 PROCEDURE — 76536 US EXAM OF HEAD AND NECK: CPT

## 2025-08-14 ENCOUNTER — RESULTS FOLLOW-UP (OUTPATIENT)
Dept: FAMILY MEDICINE | Facility: CLINIC | Age: 60
End: 2025-08-14
Payer: COMMERCIAL